# Patient Record
Sex: MALE | Race: WHITE | Employment: FULL TIME | URBAN - METROPOLITAN AREA
[De-identification: names, ages, dates, MRNs, and addresses within clinical notes are randomized per-mention and may not be internally consistent; named-entity substitution may affect disease eponyms.]

---

## 2020-08-21 ENCOUNTER — HOSPITAL ENCOUNTER (EMERGENCY)
Age: 60
Discharge: HOME | End: 2020-08-21
Attending: EMERGENCY MEDICINE
Payer: COMMERCIAL

## 2020-08-21 ENCOUNTER — APPOINTMENT (OUTPATIENT)
Dept: GENERAL RADIOLOGY | Age: 60
End: 2020-08-21
Payer: COMMERCIAL

## 2020-08-21 VITALS
HEART RATE: 54 BPM | RESPIRATION RATE: 16 BRPM | TEMPERATURE: 98.1 F | HEIGHT: 72 IN | SYSTOLIC BLOOD PRESSURE: 121 MMHG | WEIGHT: 165 LBS | OXYGEN SATURATION: 96 % | DIASTOLIC BLOOD PRESSURE: 56 MMHG | BODY MASS INDEX: 22.35 KG/M2

## 2020-08-21 PROCEDURE — 73552 X-RAY EXAM OF FEMUR 2/>: CPT

## 2020-08-21 PROCEDURE — 96375 TX/PRO/DX INJ NEW DRUG ADDON: CPT

## 2020-08-21 PROCEDURE — 6360000002 HC RX W HCPCS

## 2020-08-21 PROCEDURE — 99283 EMERGENCY DEPT VISIT LOW MDM: CPT

## 2020-08-21 PROCEDURE — 96374 THER/PROPH/DIAG INJ IV PUSH: CPT

## 2020-08-21 PROCEDURE — 90471 IMMUNIZATION ADMIN: CPT

## 2020-08-21 PROCEDURE — 90715 TDAP VACCINE 7 YRS/> IM: CPT

## 2020-08-21 RX ORDER — ONDANSETRON 2 MG/ML
INJECTION INTRAMUSCULAR; INTRAVENOUS
Status: COMPLETED
Start: 2020-08-21 | End: 2020-08-21

## 2020-08-21 RX ORDER — HYDROCODONE BITARTRATE AND ACETAMINOPHEN 5; 325 MG/1; MG/1
1 TABLET ORAL EVERY 6 HOURS PRN
Qty: 10 TABLET | Refills: 0 | Status: SHIPPED | OUTPATIENT
Start: 2020-08-21 | End: 2020-08-24

## 2020-08-21 RX ORDER — MORPHINE SULFATE 4 MG/ML
INJECTION, SOLUTION INTRAMUSCULAR; INTRAVENOUS
Status: COMPLETED
Start: 2020-08-21 | End: 2020-08-21

## 2020-08-21 RX ORDER — MORPHINE SULFATE 4 MG/ML
4 INJECTION, SOLUTION INTRAMUSCULAR; INTRAVENOUS ONCE
Status: COMPLETED | OUTPATIENT
Start: 2020-08-21 | End: 2020-08-21

## 2020-08-21 RX ORDER — ONDANSETRON 2 MG/ML
4 INJECTION INTRAMUSCULAR; INTRAVENOUS ONCE
Status: COMPLETED | OUTPATIENT
Start: 2020-08-21 | End: 2020-08-21

## 2020-08-21 RX ORDER — ONDANSETRON 4 MG/1
4 TABLET, ORALLY DISINTEGRATING ORAL ONCE
Status: DISCONTINUED | OUTPATIENT
Start: 2020-08-21 | End: 2020-08-21

## 2020-08-21 RX ORDER — PRAVASTATIN SODIUM 20 MG
20 TABLET ORAL DAILY
COMMUNITY
End: 2020-08-25

## 2020-08-21 RX ADMIN — MORPHINE SULFATE 4 MG: 4 INJECTION, SOLUTION INTRAMUSCULAR; INTRAVENOUS at 20:41

## 2020-08-21 RX ADMIN — Medication 4 MG: at 20:41

## 2020-08-21 RX ADMIN — ONDANSETRON 4 MG: 2 INJECTION INTRAMUSCULAR; INTRAVENOUS at 20:47

## 2020-08-21 RX ADMIN — TETANUS TOXOID, REDUCED DIPHTHERIA TOXOID AND ACELLULAR PERTUSSIS VACCINE, ADSORBED 0.5 ML: 5; 2.5; 8; 8; 2.5 SUSPENSION INTRAMUSCULAR at 20:50

## 2020-08-21 ASSESSMENT — ENCOUNTER SYMPTOMS: COLOR CHANGE: 1

## 2020-08-21 ASSESSMENT — PAIN DESCRIPTION - DESCRIPTORS
DESCRIPTORS: BURNING
DESCRIPTORS: ACHING

## 2020-08-21 ASSESSMENT — PAIN SCALES - GENERAL
PAINLEVEL_OUTOF10: 6
PAINLEVEL_OUTOF10: 7

## 2020-08-21 ASSESSMENT — PAIN DESCRIPTION - ORIENTATION
ORIENTATION: RIGHT;UPPER
ORIENTATION: RIGHT;UPPER

## 2020-08-21 ASSESSMENT — PAIN DESCRIPTION - LOCATION
LOCATION: LEG
LOCATION: LEG

## 2020-08-21 ASSESSMENT — PAIN DESCRIPTION - PAIN TYPE: TYPE: ACUTE PAIN

## 2020-08-21 NOTE — ED PROVIDER NOTES
41-year-old male presenting with bruising to the right leg and a little bit of discomfort. He has a history of trauma to the right leg and surgical repair a number of years ago. He was working on a house and clearing things out. Within the floor there was a cut out where people used to work on tractors. The board covering it slipped and he fell down into it. There was a tight fall for him and he has significant bruising on the right femur laterally. No current bleeding, no injury to his abdomen or chest or head or neck. No focal neurologic deficits. Distal pulses are all intact, no distress just little bit of discomfort for him. He kept working that day clearing at the house and even went to his regular job the next day as well. Review of Systems   Skin: Positive for color change and wound. All other systems reviewed and are negative. Physical Exam  Constitutional:       General: He is not in acute distress. Appearance: He is well-developed. HENT:      Head: Normocephalic and atraumatic. Eyes:      Pupils: Pupils are equal, round, and reactive to light. Neck:      Musculoskeletal: Normal range of motion and neck supple. Thyroid: No thyromegaly. Cardiovascular:      Rate and Rhythm: Normal rate and regular rhythm. Pulmonary:      Effort: Pulmonary effort is normal. No respiratory distress. Breath sounds: Normal breath sounds. No wheezing. Abdominal:      General: There is no distension. Palpations: Abdomen is soft. There is no mass. Tenderness: There is no abdominal tenderness. There is no guarding or rebound. Musculoskeletal: Normal range of motion. General: Swelling present. No tenderness. Legs:    Skin:     General: Skin is warm and dry. Findings: No erythema.              Comments: Small quarter size abrasion on the right lateral leg about mid thigh    Compartments are soft, distal pulses intact no concern for compartment syndrome or ischemic injury from the swelling   Neurological:      Mental Status: He is alert and oriented to person, place, and time. Cranial Nerves: No cranial nerve deficit. Procedures    [facility]             --------------------------------------------- PAST HISTORY ---------------------------------------------  Past Medical History:  has a past medical history of Hyperlipidemia and Thyroid disease. Past Surgical History:  has a past surgical history that includes Femur Surgery and Toe amputation. Social History:  reports that he has never smoked. He has never used smokeless tobacco. He reports that he does not drink alcohol or use drugs. Family History: family history is not on file. The patients home medications have been reviewed. Allergies: Patient has no known allergies. -------------------------------------------------- RESULTS -------------------------------------------------  Labs:  No results found for this visit on 08/21/20. Radiology:  XR FEMUR RIGHT (MIN 2 VIEWS)   Final Result      Intramedullary nagi is fractured at the level of the lesser trochanter   and distal femoral diaphysis. Expansile bone lesion of the proximal femoral diaphysis is fairly   well-corticated. ------------------------- NURSING NOTES AND VITALS REVIEWED ---------------------------  Date / Time Roomed:  8/21/2020  6:33 PM  ED Bed Assignment:  07/07    The nursing notes within the ED encounter and vital signs as below have been reviewed.    BP (!) 121/56   Pulse 54   Temp 98.1 °F (36.7 °C) (Infrared)   Resp 16   Ht 6' (1.829 m)   Wt 165 lb (74.8 kg)   SpO2 96%   BMI 22.38 kg/m²   Oxygen Saturation Interpretation: Normal      ------------------------------------------ PROGRESS NOTES ------------------------------------------  I have spoken with the patient and discussed todays results, in addition to providing specific details for the plan of care and counseling regarding the diagnosis and prognosis. Their questions are answered at this time and they are agreeable with the plan. I discussed at length with them reasons for immediate return here for re evaluation. They will followup with primary care by calling their office tomorrow. Spoke with Dr. Dima Quesada, orthopedic surgery, he reports that the patient most likely has had a break in his femoral nagi for longer than the last day. Considering that he is able to walk on his leg and is only limping a little bit supports this. He has distal pulses intact, he does have the bruise but no compartment syndrome, no concerns for ischemia on his lower leg. He will follow-up with Dr. Dima Quesada early next week.    --------------------------------- ADDITIONAL PROVIDER NOTES ---------------------------------  At this time the patient is without objective evidence of an acute process requiring hospitalization or inpatient management. They have remained hemodynamically stable throughout their entire ED visit and are stable for discharge with outpatient follow-up. The plan has been discussed in detail and they are aware of the specific conditions for emergent return, as well as the importance of follow-up. Discharge Medication List as of 8/21/2020  8:54 PM      START taking these medications    Details   HYDROcodone-acetaminophen (NORCO) 5-325 MG per tablet Take 1 tablet by mouth every 6 hours as needed for Pain for up to 3 days. , Disp-10 tablet,R-0Print             Diagnosis:  1. Injury of right lower extremity, initial encounter    2. Complication internal fixation device such as nail, plate, or nagi, initial encounter ([facility]        Disposition:  Patient's disposition: Discharge to home  Patient's condition is stable.                   Ciera Frost DO  08/22/20 1046

## 2020-08-22 NOTE — ED NOTES
Purple bruising right proximal femur ice galilea to site, abrasion lateral thigh, scabbed, cleansed with wound cleanser and bacitracin ointment bandaid applied.        Felicia Campos RN  08/21/20 2100

## 2020-08-25 ENCOUNTER — OFFICE VISIT (OUTPATIENT)
Dept: ORTHOPEDIC SURGERY | Age: 60
End: 2020-08-25
Payer: COMMERCIAL

## 2020-08-25 VITALS — TEMPERATURE: 97.1 F

## 2020-08-25 PROCEDURE — 99203 OFFICE O/P NEW LOW 30 MIN: CPT | Performed by: ORTHOPAEDIC SURGERY

## 2020-08-25 RX ORDER — PRAVASTATIN SODIUM 40 MG
TABLET ORAL
COMMUNITY
Start: 2020-08-01 | End: 2022-07-21 | Stop reason: SDUPTHER

## 2020-08-25 RX ORDER — LEVOTHYROXINE SODIUM 0.2 MG/1
TABLET ORAL
COMMUNITY
Start: 2020-08-01 | End: 2022-07-21 | Stop reason: SDUPTHER

## 2020-08-25 RX ORDER — MELOXICAM 15 MG/1
TABLET ORAL
COMMUNITY
Start: 2020-07-21 | End: 2022-07-21 | Stop reason: SDUPTHER

## 2020-08-25 RX ORDER — DIAZEPAM 5 MG/1
TABLET ORAL
COMMUNITY
Start: 2020-05-18 | End: 2022-07-21 | Stop reason: ALTCHOICE

## 2020-08-25 NOTE — PROGRESS NOTES
New Hip Patient     Referring Provider:   No referring provider defined for this encounter. CHIEF COMPLAINT:   Chief Complaint   Patient presents with   [facility] ED Follow-up     8/21 ( R ) leg femoral fx     Fracture     States that he fell into a 4x3 service pit, straight down direct impact.  Leg Pain     ( R ) leg, sensitivity to the leg. Did a 3.5 mile race on 8/22 despite the fall. Pt states its tight and brusied         HPI:    Radha Plummer is a 56y.o. year old male who is seen today  for evaluation of right hip pain. He reports the pain has been ongoing for the past 4 days. He does recall a specific injury which started the pain. Patient reports 4 days ago he was cleaning out the garage and fell into the tractor pet about 3 feet. Reports that he smacked the lateral side of his thigh on the edge when he fell. Seen and treated in the emergency department. He reports symptoms are improving. The patient does not have mechanical symptoms. He denies a feeling of instability. PAST MEDICAL HISTORY  Past Medical History:   Diagnosis Date    Hyperlipidemia     Thyroid disease        PAST SURGICAL HISTORY  Past Surgical History:   Procedure Laterality Date    FEMUR SURGERY      TOE AMPUTATION         FAMILY HISTORY   No family history on file. SOCIAL HISTORY  Social History     Occupational History    Not on file   Tobacco Use    Smoking status: Never Smoker    Smokeless tobacco: Never Used   Substance and Sexual Activity    Alcohol use: No    Drug use: No    Sexual activity: Not on file       CURRENT MEDICATIONS     Current Outpatient Medications:     diazePAM (VALIUM) 5 MG tablet, , Disp: , Rfl:     meloxicam (MOBIC) 15 MG tablet, , Disp: , Rfl:     pravastatin (PRAVACHOL) 40 MG tablet, , Disp: , Rfl:     levothyroxine (SYNTHROID) 200 MCG tablet, , Disp: , Rfl:     levothyroxine (SYNTHROID) 175 MCG tablet, Take 175 mcg by mouth daily.   , Disp: , Rfl:     ALLERGIES  No Known He will call the office if symptoms worsen. He will follow-up as needed. Chris Avila, [address] [facility]  Orthopaedic Surgery   8/25/20  9:01 AM      Staff Addendum    I have seen and evaluated the patient and agree with the assessment and plan as documented by Chris Avila CNP. I have performed the key components of the history and physical examination and concur with the findings and plan, and have made changes where appropriate/necessary. Agree with above. He has femur fracture fixed many years ago, the nail is cracked in 2 places which I suspect is chronic. He does not require any further management currently. He will continue to progress with normal activities. We will see him back as needed.       Jay White MD  [address]

## 2020-09-21 ENCOUNTER — OFFICE VISIT (OUTPATIENT)
Dept: ORTHOPEDIC SURGERY | Age: 60
End: 2020-09-21
Payer: COMMERCIAL

## 2020-09-21 VITALS — HEIGHT: 72 IN | WEIGHT: 165 LBS | BODY MASS INDEX: 22.35 KG/M2 | TEMPERATURE: 97.1 F

## 2020-09-21 PROCEDURE — 99213 OFFICE O/P EST LOW 20 MIN: CPT | Performed by: ORTHOPAEDIC SURGERY

## 2020-09-21 NOTE — PROGRESS NOTES
Follow Up Visit     Lynn Easley returns today for follow up visit regarding his right hip pain from a fall that happened on 8/21/20. Treatment thus far has included non-op management. He noticed a swelling of the right hip that does not cause him discomfort but it is bruised. He reports symptoms are improved. Physical Exam:     Height: 6' (1.829 m), Weight: 165 lb (74.8 kg)    General: Alert and oriented x3, no acute distress  Cardiovascular/pulmonary: No labored breathing, peripheral perfusion intact  Musculoskeletal:    Right hip soft tissue swelling present fluctuant no redness or tenderness present on exam.  Area of swelling is directly over the greater trochanter her bursa. No pain present on exam no signs or symptoms of infection present. Controlled Substances Monitoring:      Imaging:  Being obtained today. Assessment: Right hip swelling      Plan: Today we discussed his right hip. Patient had an injury about 5 weeks ago which he states has improved. He however states he noticed increased swelling around his right hip that has remained but causes no pain. Patient followed up today for reassurance that this would improve on its own. Patient likely has residual hematoma or swelling from trochanteric bursitis. Patient was advised to take over-the-counter anti-inflammatories, ice the affected area. He verbalized understanding. We discussed signs and symptoms of infection and follow-up appointment if pain to the area develops. Patient verbalizes understanding. He understands also that this could take some time to resolve. Fadi Fernandez [facility]  Orthopedic Surgery   09/21/20  4:55 PM      Staff Addendum    I have seen and evaluated the patient and agree with the assessment and plan as documented by Fadi Fernandez CNP. I have performed the key components of the history and physical examination and concur with the findings and plan, and have made changes where appropriate/necessary. Jay White MD  [address] [place] [address]

## 2020-11-21 LAB — PROSTATE SPECIFIC ANTIGEN: NORMAL

## 2020-12-13 ENCOUNTER — HOSPITAL ENCOUNTER (OUTPATIENT)
Age: 60
Discharge: HOME | End: 2020-12-15
Payer: COMMERCIAL

## 2020-12-13 ENCOUNTER — HOSPITAL ENCOUNTER (OUTPATIENT)
Dept: GENERAL RADIOLOGY | Age: 60
Discharge: HOME | End: 2020-12-15
Payer: COMMERCIAL

## 2020-12-13 PROCEDURE — 72040 X-RAY EXAM NECK SPINE 2-3 VW: CPT

## 2021-06-02 LAB
ALBUMIN SERPL-MCNC: NORMAL G/DL
ALP BLD-CCNC: NORMAL U/L
ALT SERPL-CCNC: NORMAL U/L
ANION GAP SERPL CALCULATED.3IONS-SCNC: NORMAL MMOL/L
AST SERPL-CCNC: NORMAL U/L
AVERAGE GLUCOSE: NORMAL
BASOPHILS ABSOLUTE: NORMAL
BASOPHILS RELATIVE PERCENT: NORMAL
BILIRUB SERPL-MCNC: NORMAL MG/DL
BUN BLDV-MCNC: NORMAL MG/DL
CALCIUM SERPL-MCNC: NORMAL MG/DL
CHLORIDE BLD-SCNC: NORMAL MMOL/L
CHOLESTEROL, TOTAL: NORMAL
CHOLESTEROL/HDL RATIO: NORMAL
CO2: NORMAL
CREAT SERPL-MCNC: NORMAL MG/DL
EOSINOPHILS ABSOLUTE: NORMAL
EOSINOPHILS RELATIVE PERCENT: NORMAL
GFR CALCULATED: NORMAL
GLUCOSE BLD-MCNC: NORMAL MG/DL
HBA1C MFR BLD: 5 %
HCT VFR BLD CALC: NORMAL %
HDLC SERPL-MCNC: NORMAL MG/DL
HEMOGLOBIN: NORMAL
LDL CHOLESTEROL CALCULATED: NORMAL
LYMPHOCYTES ABSOLUTE: NORMAL
LYMPHOCYTES RELATIVE PERCENT: NORMAL
MCH RBC QN AUTO: NORMAL PG
MCHC RBC AUTO-ENTMCNC: NORMAL G/DL
MCV RBC AUTO: NORMAL FL
MONOCYTES ABSOLUTE: NORMAL
MONOCYTES RELATIVE PERCENT: NORMAL
NEUTROPHILS ABSOLUTE: NORMAL
NEUTROPHILS RELATIVE PERCENT: NORMAL
NONHDLC SERPL-MCNC: NORMAL MG/DL
PLATELET # BLD: NORMAL 10*3/UL
PMV BLD AUTO: NORMAL FL
POTASSIUM SERPL-SCNC: NORMAL MMOL/L
RBC # BLD: NORMAL 10*6/UL
SODIUM BLD-SCNC: NORMAL MMOL/L
TOTAL PROTEIN: NORMAL
TRIGL SERPL-MCNC: NORMAL MG/DL
VLDLC SERPL CALC-MCNC: NORMAL MG/DL
WBC # BLD: NORMAL 10*3/UL

## 2021-09-09 VITALS
BODY MASS INDEX: 22.62 KG/M2 | HEART RATE: 66 BPM | WEIGHT: 167 LBS | DIASTOLIC BLOOD PRESSURE: 54 MMHG | RESPIRATION RATE: 16 BRPM | HEIGHT: 72 IN | OXYGEN SATURATION: 98 % | SYSTOLIC BLOOD PRESSURE: 98 MMHG

## 2022-07-21 ENCOUNTER — OFFICE VISIT (OUTPATIENT)
Dept: PRIMARY CARE CLINIC | Age: 62
End: 2022-07-21
Payer: COMMERCIAL

## 2022-07-21 VITALS
BODY MASS INDEX: 22.62 KG/M2 | HEIGHT: 72 IN | DIASTOLIC BLOOD PRESSURE: 86 MMHG | TEMPERATURE: 98 F | WEIGHT: 167 LBS | RESPIRATION RATE: 18 BRPM | SYSTOLIC BLOOD PRESSURE: 128 MMHG | OXYGEN SATURATION: 97 % | HEART RATE: 53 BPM

## 2022-07-21 DIAGNOSIS — E55.9 VITAMIN D DEFICIENCY: ICD-10-CM

## 2022-07-21 DIAGNOSIS — E78.2 MIXED HYPERLIPIDEMIA: ICD-10-CM

## 2022-07-21 DIAGNOSIS — E06.3 HYPOTHYROIDISM DUE TO HASHIMOTO'S THYROIDITIS: ICD-10-CM

## 2022-07-21 DIAGNOSIS — Z12.11 COLON CANCER SCREENING: ICD-10-CM

## 2022-07-21 DIAGNOSIS — E03.8 HYPOTHYROIDISM DUE TO HASHIMOTO'S THYROIDITIS: ICD-10-CM

## 2022-07-21 DIAGNOSIS — Z00.00 WELL ADULT EXAM: Primary | ICD-10-CM

## 2022-07-21 DIAGNOSIS — Z12.5 PROSTATE CANCER SCREENING: ICD-10-CM

## 2022-07-21 DIAGNOSIS — Z76.89 ESTABLISHING CARE WITH NEW DOCTOR, ENCOUNTER FOR: ICD-10-CM

## 2022-07-21 PROCEDURE — 99396 PREV VISIT EST AGE 40-64: CPT | Performed by: FAMILY MEDICINE

## 2022-07-21 RX ORDER — LEVOTHYROXINE SODIUM 0.2 MG/1
200 TABLET ORAL DAILY
Qty: 30 TABLET | Refills: 2 | Status: SHIPPED
Start: 2022-07-21 | End: 2022-09-21 | Stop reason: SDUPTHER

## 2022-07-21 RX ORDER — PRAVASTATIN SODIUM 40 MG
40 TABLET ORAL DAILY
Qty: 90 TABLET | Refills: 2 | Status: SHIPPED | OUTPATIENT
Start: 2022-07-21

## 2022-07-21 RX ORDER — MELOXICAM 15 MG/1
15 TABLET ORAL DAILY
Qty: 30 TABLET | Refills: 0 | Status: SHIPPED
Start: 2022-07-21 | End: 2022-09-21 | Stop reason: SDUPTHER

## 2022-07-21 SDOH — ECONOMIC STABILITY: FOOD INSECURITY: WITHIN THE PAST 12 MONTHS, THE FOOD YOU BOUGHT JUST DIDN'T LAST AND YOU DIDN'T HAVE MONEY TO GET MORE.: NEVER TRUE

## 2022-07-21 SDOH — ECONOMIC STABILITY: FOOD INSECURITY: WITHIN THE PAST 12 MONTHS, YOU WORRIED THAT YOUR FOOD WOULD RUN OUT BEFORE YOU GOT MONEY TO BUY MORE.: NEVER TRUE

## 2022-07-21 ASSESSMENT — ENCOUNTER SYMPTOMS
CONSTIPATION: 0
SORE THROAT: 0
ABDOMINAL PAIN: 0
SHORTNESS OF BREATH: 0
VOMITING: 0
WHEEZING: 0
PHOTOPHOBIA: 0
COUGH: 0
BLOOD IN STOOL: 0
DIARRHEA: 0
RHINORRHEA: 0
EYE REDNESS: 0
NAUSEA: 0

## 2022-07-21 ASSESSMENT — PATIENT HEALTH QUESTIONNAIRE - PHQ9
SUM OF ALL RESPONSES TO PHQ9 QUESTIONS 1 & 2: 0
SUM OF ALL RESPONSES TO PHQ QUESTIONS 1-9: 0
SUM OF ALL RESPONSES TO PHQ QUESTIONS 1-9: 0
2. FEELING DOWN, DEPRESSED OR HOPELESS: 0
1. LITTLE INTEREST OR PLEASURE IN DOING THINGS: 0
SUM OF ALL RESPONSES TO PHQ QUESTIONS 1-9: 0
SUM OF ALL RESPONSES TO PHQ QUESTIONS 1-9: 0

## 2022-07-21 ASSESSMENT — SOCIAL DETERMINANTS OF HEALTH (SDOH): HOW HARD IS IT FOR YOU TO PAY FOR THE VERY BASICS LIKE FOOD, HOUSING, MEDICAL CARE, AND HEATING?: NOT HARD AT ALL

## 2022-07-21 NOTE — PROGRESS NOTES
2022    Chief Complaint   Patient presents with    Established New Doctor     JOHN  Nain Tom (:  1960) is a 57 y.o. male, here for evaluation of the following medical concerns:    Patient is a 68-year-old male with a past medical history of hypothyroidism, arthritis, hyperlipidemia who presents today to establish care with myself. Hypothyroidism: Patient reports he has not been regularly taking his medications as previous provider recently retired. Patient on Synthroid 200 mcg daily. Hyperlipidemia: Patient is on pravastatin 40 mg daily. Needs updated lipid panel. Tolerating this medication well without any issues. Patient does regularly exercise and run. BMI 22. History of mild panic attacks at times. Previously used Valium. No recent issues. Chronic low back pain/arthritis: Patient does use NSAIDs, topical pain/ointments, and Mobic at times. HM: We will update. Also due for shingles vaccine. Review of Systems   Constitutional:  Negative for chills, fatigue and fever. HENT:  Negative for congestion, hearing loss, postnasal drip, rhinorrhea, sneezing, sore throat and tinnitus. Eyes:  Negative for photophobia and redness. Respiratory:  Negative for cough, shortness of breath and wheezing. Cardiovascular:  Negative for chest pain, palpitations and leg swelling. Gastrointestinal:  Negative for abdominal pain, blood in stool, constipation, diarrhea, nausea and vomiting. Endocrine: Negative for polydipsia and polyuria. Genitourinary:  Negative for difficulty urinating, dysuria, frequency and hematuria. Musculoskeletal:  Negative for arthralgias and myalgias. Skin:  Negative for rash. Neurological:  Negative for dizziness, syncope, weakness, light-headedness, numbness and headaches. Psychiatric/Behavioral:  The patient is not nervous/anxious. Prior to Visit Medications    Medication Sig Taking?  Authorizing Provider   pravastatin (PRAVACHOL) 40 MG tablet Take 1 tablet by mouth in the morning. Yes Rocio Fried MD   levothyroxine (SYNTHROID) 200 MCG tablet Take 1 tablet by mouth in the morning. Yes Rocio Fried MD   meloxicam (MOBIC) 15 MG tablet Take 1 tablet by mouth in the morning. Yes Rocio Fried MD        No Known Allergies    Past Medical History:   Diagnosis Date    Hyperlipidemia     Thyroid disease         Past Surgical History:   Procedure Laterality Date    FEMUR SURGERY      Julio C in place, pins removed    TOE AMPUTATION      work accident       History reviewed. No pertinent family history.     Immunization History   Administered Date(s) Administered    COVID-19, MODERNA BLUE border, Primary or Immunocompromised, (age 12y+), IM, 100 mcg/0.5mL 02/18/2021, 03/26/2021, 12/14/2021    Influenza, MDCK Quadv, IM, PF (Flucelvax 2 yrs and older) 10/25/2018    Tdap (Boostrix, Adacel) 08/21/2020       Health Maintenance   Topic Date Due    Colorectal Cancer Screen  Never done    Shingles vaccine (1 of 2) Never done    Prostate Specific Antigen (PSA) Screening or Monitoring  11/20/2021    COVID-19 Vaccine (4 - Booster for Moderna series) 04/14/2022    Lipids  06/01/2022    Flu vaccine (1) 09/01/2022    Depression Screen  07/21/2023    DTaP/Tdap/Td vaccine (2 - Td or Tdap) 08/21/2030    Hepatitis A vaccine  Aged Out    Hepatitis B vaccine  Aged Out    Hib vaccine  Aged Out    Meningococcal (ACWY) vaccine  Aged Out    Pneumococcal 0-64 years Vaccine  Aged Out    Hepatitis C screen  Discontinued    HIV screen  Discontinued       Social History     Socioeconomic History    Marital status: Single     Spouse name: Not on file    Number of children: Not on file    Years of education: Not on file    Highest education level: Not on file   Occupational History    Not on file   Tobacco Use    Smoking status: Never    Smokeless tobacco: Never   Substance and Sexual Activity    Alcohol use: No    Drug use: No    Sexual activity: Not on file   Other Topics Concern    Not on file   Social History Narrative    Not on file     Social Determinants of Health     Financial Resource Strain: Low Risk     Difficulty of Paying Living Expenses: Not hard at all   Food Insecurity: No Food Insecurity    Worried About Running Out of Food in the Last Year: Never true    Ran Out of Food in the Last Year: Never true   Transportation Needs: Not on file   Physical Activity: Not on file   Stress: Not on file   Social Connections: Not on file   Intimate Partner Violence: Not on file   Housing Stability: Not on file         Vitals:    07/21/22 1358   BP: 128/86   Pulse: 53   Resp: 18   Temp: 98 °F (36.7 °C)   TempSrc: Temporal   SpO2: 97%   Weight: 167 lb (75.8 kg)   Height: 6' (1.829 m)       Estimated body mass index is 22.65 kg/m² as calculated from the following:    Height as of this encounter: 6' (1.829 m). Weight as of this encounter: 167 lb (75.8 kg). Physical Exam  Vitals and nursing note reviewed. Constitutional:       Appearance: He is well-developed. HENT:      Head: Normocephalic. Right Ear: Tympanic membrane and external ear normal.      Left Ear: Tympanic membrane and external ear normal.   Eyes:      Extraocular Movements: Extraocular movements intact. Conjunctiva/sclera: Conjunctivae normal.      Pupils: Pupils are equal, round, and reactive to light. Neck:      Trachea: Trachea normal.   Cardiovascular:      Rate and Rhythm: Normal rate and regular rhythm. Pulses:           Radial pulses are 2+ on the right side and 2+ on the left side. Posterior tibial pulses are 2+ on the right side and 2+ on the left side. Heart sounds: Normal heart sounds. No murmur heard. Pulmonary:      Effort: Pulmonary effort is normal. No respiratory distress. Breath sounds: Normal breath sounds. No decreased breath sounds, wheezing or rales. Abdominal:      General: Bowel sounds are normal. There is no distension.       Palpations: Abdomen is

## 2022-08-04 LAB — NONINV COLON CA DNA+OCC BLD SCRN STL QL: NEGATIVE

## 2022-09-16 DIAGNOSIS — Z12.5 PROSTATE CANCER SCREENING: ICD-10-CM

## 2022-09-16 DIAGNOSIS — E78.2 MIXED HYPERLIPIDEMIA: ICD-10-CM

## 2022-09-16 DIAGNOSIS — E55.9 VITAMIN D DEFICIENCY: ICD-10-CM

## 2022-09-16 DIAGNOSIS — E03.8 HYPOTHYROIDISM DUE TO HASHIMOTO'S THYROIDITIS: ICD-10-CM

## 2022-09-16 DIAGNOSIS — E06.3 HYPOTHYROIDISM DUE TO HASHIMOTO'S THYROIDITIS: ICD-10-CM

## 2022-09-16 LAB
ALBUMIN SERPL-MCNC: 4.4 G/DL (ref 3.5–5.2)
ALP BLD-CCNC: 74 U/L (ref 40–129)
ALT SERPL-CCNC: 35 U/L (ref 0–40)
ANION GAP SERPL CALCULATED.3IONS-SCNC: 13 MMOL/L (ref 7–16)
AST SERPL-CCNC: 34 U/L (ref 0–39)
BASOPHILS ABSOLUTE: 0.02 E9/L (ref 0–0.2)
BASOPHILS RELATIVE PERCENT: 0.5 % (ref 0–2)
BILIRUB SERPL-MCNC: 0.6 MG/DL (ref 0–1.2)
BUN BLDV-MCNC: 19 MG/DL (ref 6–23)
CALCIUM SERPL-MCNC: 9.5 MG/DL (ref 8.6–10.2)
CHLORIDE BLD-SCNC: 104 MMOL/L (ref 98–107)
CHOLESTEROL, TOTAL: 202 MG/DL (ref 0–199)
CO2: 24 MMOL/L (ref 22–29)
CREAT SERPL-MCNC: 0.9 MG/DL (ref 0.7–1.2)
EOSINOPHILS ABSOLUTE: 0.15 E9/L (ref 0.05–0.5)
EOSINOPHILS RELATIVE PERCENT: 3.4 % (ref 0–6)
GFR AFRICAN AMERICAN: >60
GFR NON-AFRICAN AMERICAN: >60 ML/MIN/1.73
GLUCOSE BLD-MCNC: 110 MG/DL (ref 74–99)
HCT VFR BLD CALC: 45 % (ref 37–54)
HDLC SERPL-MCNC: 71 MG/DL
HEMOGLOBIN: 15.4 G/DL (ref 12.5–16.5)
IMMATURE GRANULOCYTES #: 0.01 E9/L
IMMATURE GRANULOCYTES %: 0.2 % (ref 0–5)
LDL CHOLESTEROL CALCULATED: 121 MG/DL (ref 0–99)
LYMPHOCYTES ABSOLUTE: 1.51 E9/L (ref 1.5–4)
LYMPHOCYTES RELATIVE PERCENT: 34.1 % (ref 20–42)
MCH RBC QN AUTO: 31.8 PG (ref 26–35)
MCHC RBC AUTO-ENTMCNC: 34.2 % (ref 32–34.5)
MCV RBC AUTO: 93 FL (ref 80–99.9)
MONOCYTES ABSOLUTE: 0.49 E9/L (ref 0.1–0.95)
MONOCYTES RELATIVE PERCENT: 11.1 % (ref 2–12)
NEUTROPHILS ABSOLUTE: 2.25 E9/L (ref 1.8–7.3)
NEUTROPHILS RELATIVE PERCENT: 50.7 % (ref 43–80)
PDW BLD-RTO: 13.1 FL (ref 11.5–15)
PLATELET # BLD: 176 E9/L (ref 130–450)
PMV BLD AUTO: 11.1 FL (ref 7–12)
POTASSIUM SERPL-SCNC: 4.3 MMOL/L (ref 3.5–5)
PROSTATE SPECIFIC ANTIGEN: 0.57 NG/ML (ref 0–4)
RBC # BLD: 4.84 E12/L (ref 3.8–5.8)
SODIUM BLD-SCNC: 141 MMOL/L (ref 132–146)
T4 FREE: 1.61 NG/DL (ref 0.93–1.7)
TOTAL PROTEIN: 6.8 G/DL (ref 6.4–8.3)
TRIGL SERPL-MCNC: 52 MG/DL (ref 0–149)
TSH SERPL DL<=0.05 MIU/L-ACNC: 0.67 UIU/ML (ref 0.27–4.2)
VITAMIN D 25-HYDROXY: 97 NG/ML (ref 30–100)
VLDLC SERPL CALC-MCNC: 10 MG/DL
WBC # BLD: 4.4 E9/L (ref 4.5–11.5)

## 2022-09-21 ENCOUNTER — OFFICE VISIT (OUTPATIENT)
Dept: PRIMARY CARE CLINIC | Age: 62
End: 2022-09-21
Payer: COMMERCIAL

## 2022-09-21 VITALS
SYSTOLIC BLOOD PRESSURE: 132 MMHG | RESPIRATION RATE: 18 BRPM | DIASTOLIC BLOOD PRESSURE: 84 MMHG | BODY MASS INDEX: 22.62 KG/M2 | HEART RATE: 50 BPM | WEIGHT: 167 LBS | HEIGHT: 72 IN | TEMPERATURE: 97.9 F | OXYGEN SATURATION: 97 %

## 2022-09-21 DIAGNOSIS — B86 SCABIES: ICD-10-CM

## 2022-09-21 DIAGNOSIS — E55.9 VITAMIN D DEFICIENCY: ICD-10-CM

## 2022-09-21 DIAGNOSIS — E06.3 HYPOTHYROIDISM DUE TO HASHIMOTO'S THYROIDITIS: ICD-10-CM

## 2022-09-21 DIAGNOSIS — E03.8 HYPOTHYROIDISM DUE TO HASHIMOTO'S THYROIDITIS: ICD-10-CM

## 2022-09-21 DIAGNOSIS — E78.2 MIXED HYPERLIPIDEMIA: ICD-10-CM

## 2022-09-21 DIAGNOSIS — M54.50 CHRONIC BILATERAL LOW BACK PAIN WITHOUT SCIATICA: Primary | ICD-10-CM

## 2022-09-21 DIAGNOSIS — R73.01 IMPAIRED FASTING BLOOD SUGAR: ICD-10-CM

## 2022-09-21 DIAGNOSIS — G89.29 CHRONIC BILATERAL LOW BACK PAIN WITHOUT SCIATICA: Primary | ICD-10-CM

## 2022-09-21 PROCEDURE — 99214 OFFICE O/P EST MOD 30 MIN: CPT | Performed by: FAMILY MEDICINE

## 2022-09-21 RX ORDER — LEVOTHYROXINE SODIUM 0.2 MG/1
200 TABLET ORAL DAILY
Qty: 90 TABLET | Refills: 1 | Status: SHIPPED | OUTPATIENT
Start: 2022-09-21

## 2022-09-21 RX ORDER — PERMETHRIN 50 MG/G
CREAM TOPICAL ONCE
Qty: 60 G | Refills: 0 | Status: SHIPPED
Start: 2022-09-21 | End: 2022-09-26 | Stop reason: SDUPTHER

## 2022-09-21 RX ORDER — MELOXICAM 15 MG/1
15 TABLET ORAL DAILY
Qty: 30 TABLET | Refills: 0 | Status: SHIPPED | OUTPATIENT
Start: 2022-09-21

## 2022-09-21 ASSESSMENT — ENCOUNTER SYMPTOMS
ABDOMINAL PAIN: 0
VOMITING: 0
NAUSEA: 0
SHORTNESS OF BREATH: 0
RHINORRHEA: 0
SORE THROAT: 0
DIARRHEA: 0
CONSTIPATION: 0
WHEEZING: 0

## 2022-09-21 NOTE — PROGRESS NOTES
2022     Chief Complaint   Patient presents with    Results       HPI  Lizzy Wright (:  1960) is a 57 y.o. male, here for evaluation of the following medical concerns:    Patient is a 58-year-old male with a past medical history of hypothyroidism, arthritis, hyperlipidemia who presents today to f/u his establish care apt and lab. Hypothyroidism: Patient reports he has not been regularly taking his medications as previous provider recently retired. Patient on Synthroid 200 mcg daily. Hyperlipidemia: Patient is on pravastatin 40 mg daily. Tolerating this medication well without any issues. Patient does regularly exercise and run. BMI 22. History of mild panic attacks at times. Previously used Valium. No recent issues. Chronic low back pain/arthritis: Patient does use NSAIDs, topical pain/ointments, and Mobic at times. Concerns that the patient's significant other may have been exposed to scabies. HM: We will update. Also due for shingles vaccine. Labs: CBC essentially within normal limits. CMP with a mildly elevated fasting glucose of 110. Total cholesterol 202. Triglycerides 52. HDL 71. . He has had 0.6. Free T4 1.6. Vit D 97. PSA 0.5. Review of Systems   Constitutional:  Negative for chills and fever. HENT:  Negative for congestion, rhinorrhea and sore throat. Respiratory:  Negative for shortness of breath and wheezing. Cardiovascular:  Negative for chest pain and leg swelling. Gastrointestinal:  Negative for abdominal pain, constipation, diarrhea, nausea and vomiting. Skin:  Negative for rash. Neurological:  Negative for light-headedness and headaches. Past Medical History:   Diagnosis Date    Hyperlipidemia     Thyroid disease        Prior to Visit Medications    Medication Sig Taking?  Authorizing Provider   levothyroxine (SYNTHROID) 200 MCG tablet Take 1 tablet by mouth Daily Yes Danny Giles MD   meloxicaleida [facility]) 15 MG tablet Take 1 tablet by mouth daily Yes Leatha Lr MD   permethrin (ELIMITE) 5 % cream Apply topically once for 1 dose Apply topically as directed Yes Leatha Lr MD   pravastatin (PRAVACHOL) 40 MG tablet Take 1 tablet by mouth in the morning. Yes Leatha Lr MD        No Known Allergies    Social History     Tobacco Use    Smoking status: Never    Smokeless tobacco: Never   Substance Use Topics    Alcohol use: No           Vitals:    09/21/22 1640   BP: 132/84   Pulse: 50   Resp: 18   Temp: 97.9 °F (36.6 °C)   TempSrc: Temporal   SpO2: 97%   Weight: 167 lb (75.8 kg)   Height: 6' (1.829 m)     Estimated body mass index is 22.65 kg/m² as calculated from the following:    Height as of this encounter: 6' (1.829 m). Weight as of this encounter: 167 lb (75.8 kg). Physical Exam  Constitutional:       Appearance: He is well-developed. HENT:      Head: Normocephalic. Eyes:      Extraocular Movements: Extraocular movements intact. Conjunctiva/sclera: Conjunctivae normal.   Cardiovascular:      Rate and Rhythm: Normal rate and regular rhythm. Heart sounds: Normal heart sounds. No murmur heard. Pulmonary:      Effort: Pulmonary effort is normal.      Breath sounds: Normal breath sounds. No wheezing or rales. Abdominal:      General: Bowel sounds are normal.      Palpations: Abdomen is soft. Tenderness: There is no abdominal tenderness. Musculoskeletal:      Right lower leg: No edema. Left lower leg: No edema. Neurological:      General: No focal deficit present. Mental Status: He is alert. Comments: Cranial nerves grossly intact   Psychiatric:         Mood and Affect: Mood normal.         Judgment: Judgment normal.       ASSESSMENT/PLAN:    Labs: CBC essentially within normal limits. CMP with a mildly elevated fasting glucose of 110. Total cholesterol 202. Triglycerides 52. HDL 71. . He has had 0.6. Free T4 1.6. Vit D 97. PSA 0.5.     Steffany Connell was seen today for results. Diagnoses and all orders for this visit:    Chronic bilateral low back pain without sciatica  -     meloxicam (MOBIC) 15 MG tablet; Take 1 tablet by mouth daily  Pain is worse in his low back, hips, knees with running. Mobic as needed. Refill provided. Patient uses this only occasionally with significant exertion/exercise. Hypothyroidism due to Hashimoto's thyroiditis  -     levothyroxine (SYNTHROID) 200 MCG tablet; Take 1 tablet by mouth Daily  -     CBC with Auto Differential; Future  -     Comprehensive Metabolic Panel; Future  -     TSH; Future  -     T4, Free; Future  Levels appropriate. Discussed possibly slightly decreasing patient's Synthroid. For now we will give the patient on 200 mcg of Synthroid daily. Mixed hyperlipidemia  -     CBC with Auto Differential; Future  -     Comprehensive Metabolic Panel; Future  -     Lipid Panel; Future  Lipid panel slightly elevated. Lifestyle modifications reviewed and advised. Patient does in general practice healthy lifestyle habits. Patient to decrease sugar intake. Continue pravastatin 40 mg daily. Vitamin D deficiency  -     Vitamin D 25 Hydroxy; Future  Continue OTC vitamin D. Impaired fasting blood sugar  -     CBC with Auto Differential; Future  -     Comprehensive Metabolic Panel; Future  -     Hemoglobin A1C; Future    Scabies  -     permethrin (ELIMITE) 5 % cream; Apply topically once for 1 dose Apply topically as directed      Return in about 6 months (around 3/21/2023) for To Discuss Labs Results, Routine Follow-up of Chronic Conditions. An [employer]ature was used to authenticate this note.     --Ivan Morin MD on 9/21/22 at 4:43 PM EDT

## 2022-09-26 ENCOUNTER — TELEPHONE (OUTPATIENT)
Dept: PRIMARY CARE CLINIC | Age: 62
End: 2022-09-26

## 2022-09-26 DIAGNOSIS — B86 SCABIES: ICD-10-CM

## 2022-09-26 RX ORDER — PERMETHRIN 50 MG/G
CREAM TOPICAL
Qty: 360 G | Refills: 0 | Status: SHIPPED | OUTPATIENT
Start: 2022-09-26

## 2022-09-26 NOTE — TELEPHONE ENCOUNTER
Patient seen last week and was given cream for scabies permethrin (ELIMITE) 5 % cream. Girlfriend was confirmed to have Encrusted scabies. Was told that he would need to be on the cream for longer than a 1 time application. They recommended every other day for 2 weeks and the one tube will only get him 2 applications. Started to use it yesterday   [employer].    States he would need 5 refills         Please call patient back so he is aware that med was sent

## 2023-03-02 DIAGNOSIS — E06.3 HYPOTHYROIDISM DUE TO HASHIMOTO'S THYROIDITIS: ICD-10-CM

## 2023-03-02 DIAGNOSIS — E55.9 VITAMIN D DEFICIENCY: ICD-10-CM

## 2023-03-02 DIAGNOSIS — E78.2 MIXED HYPERLIPIDEMIA: ICD-10-CM

## 2023-03-02 DIAGNOSIS — R73.01 IMPAIRED FASTING BLOOD SUGAR: ICD-10-CM

## 2023-03-02 DIAGNOSIS — E03.8 HYPOTHYROIDISM DUE TO HASHIMOTO'S THYROIDITIS: ICD-10-CM

## 2023-03-02 LAB
ALBUMIN SERPL-MCNC: 4.1 G/DL (ref 3.5–5.2)
ALP BLD-CCNC: 74 U/L (ref 40–129)
ALT SERPL-CCNC: 24 U/L (ref 0–40)
ANION GAP SERPL CALCULATED.3IONS-SCNC: 13 MMOL/L (ref 7–16)
AST SERPL-CCNC: 29 U/L (ref 0–39)
BASOPHILS ABSOLUTE: 0.02 E9/L (ref 0–0.2)
BASOPHILS RELATIVE PERCENT: 0.4 % (ref 0–2)
BILIRUB SERPL-MCNC: 0.7 MG/DL (ref 0–1.2)
BUN BLDV-MCNC: 18 MG/DL (ref 6–23)
CALCIUM SERPL-MCNC: 9.4 MG/DL (ref 8.6–10.2)
CHLORIDE BLD-SCNC: 106 MMOL/L (ref 98–107)
CHOLESTEROL, TOTAL: 183 MG/DL (ref 0–199)
CO2: 24 MMOL/L (ref 22–29)
CREAT SERPL-MCNC: 0.8 MG/DL (ref 0.7–1.2)
EOSINOPHILS ABSOLUTE: 0.19 E9/L (ref 0.05–0.5)
EOSINOPHILS RELATIVE PERCENT: 3.9 % (ref 0–6)
GFR SERPL CREATININE-BSD FRML MDRD: >60 ML/MIN/1.73
GLUCOSE BLD-MCNC: 110 MG/DL (ref 74–99)
HBA1C MFR BLD: 5.3 % (ref 4–5.6)
HCT VFR BLD CALC: 48.7 % (ref 37–54)
HDLC SERPL-MCNC: 56 MG/DL
HEMOGLOBIN: 16.3 G/DL (ref 12.5–16.5)
IMMATURE GRANULOCYTES #: 0.01 E9/L
IMMATURE GRANULOCYTES %: 0.2 % (ref 0–5)
LDL CHOLESTEROL CALCULATED: 112 MG/DL (ref 0–99)
LYMPHOCYTES ABSOLUTE: 1.72 E9/L (ref 1.5–4)
LYMPHOCYTES RELATIVE PERCENT: 35.6 % (ref 20–42)
MCH RBC QN AUTO: 31.3 PG (ref 26–35)
MCHC RBC AUTO-ENTMCNC: 33.5 % (ref 32–34.5)
MCV RBC AUTO: 93.7 FL (ref 80–99.9)
MONOCYTES ABSOLUTE: 0.6 E9/L (ref 0.1–0.95)
MONOCYTES RELATIVE PERCENT: 12.4 % (ref 2–12)
NEUTROPHILS ABSOLUTE: 2.29 E9/L (ref 1.8–7.3)
NEUTROPHILS RELATIVE PERCENT: 47.5 % (ref 43–80)
PDW BLD-RTO: 13.2 FL (ref 11.5–15)
PLATELET # BLD: 184 E9/L (ref 130–450)
PMV BLD AUTO: 11 FL (ref 7–12)
POTASSIUM SERPL-SCNC: 4.7 MMOL/L (ref 3.5–5)
RBC # BLD: 5.2 E12/L (ref 3.8–5.8)
SODIUM BLD-SCNC: 143 MMOL/L (ref 132–146)
T4 FREE: 1.69 NG/DL (ref 0.93–1.7)
TOTAL PROTEIN: 6.7 G/DL (ref 6.4–8.3)
TRIGL SERPL-MCNC: 74 MG/DL (ref 0–149)
TSH SERPL DL<=0.05 MIU/L-ACNC: 0.1 UIU/ML (ref 0.27–4.2)
VITAMIN D 25-HYDROXY: 65 NG/ML (ref 30–100)
VLDLC SERPL CALC-MCNC: 15 MG/DL
WBC # BLD: 4.8 E9/L (ref 4.5–11.5)

## 2023-03-06 ENCOUNTER — OFFICE VISIT (OUTPATIENT)
Dept: PRIMARY CARE CLINIC | Age: 63
End: 2023-03-06
Payer: COMMERCIAL

## 2023-03-06 VITALS
HEIGHT: 72 IN | WEIGHT: 172 LBS | BODY MASS INDEX: 23.3 KG/M2 | OXYGEN SATURATION: 97 % | SYSTOLIC BLOOD PRESSURE: 96 MMHG | TEMPERATURE: 97.9 F | HEART RATE: 55 BPM | DIASTOLIC BLOOD PRESSURE: 70 MMHG | RESPIRATION RATE: 20 BRPM

## 2023-03-06 DIAGNOSIS — M54.50 CHRONIC BILATERAL LOW BACK PAIN WITHOUT SCIATICA: ICD-10-CM

## 2023-03-06 DIAGNOSIS — G89.29 CHRONIC BILATERAL LOW BACK PAIN WITHOUT SCIATICA: ICD-10-CM

## 2023-03-06 DIAGNOSIS — E06.3 HYPOTHYROIDISM DUE TO HASHIMOTO'S THYROIDITIS: ICD-10-CM

## 2023-03-06 DIAGNOSIS — E03.8 HYPOTHYROIDISM DUE TO HASHIMOTO'S THYROIDITIS: ICD-10-CM

## 2023-03-06 DIAGNOSIS — E78.2 MIXED HYPERLIPIDEMIA: ICD-10-CM

## 2023-03-06 PROCEDURE — 99213 OFFICE O/P EST LOW 20 MIN: CPT | Performed by: FAMILY MEDICINE

## 2023-03-06 PROCEDURE — 1123F ACP DISCUSS/DSCN MKR DOCD: CPT | Performed by: FAMILY MEDICINE

## 2023-03-06 RX ORDER — MELOXICAM 15 MG/1
15 TABLET ORAL DAILY
Qty: 30 TABLET | Refills: 1 | Status: SHIPPED | OUTPATIENT
Start: 2023-03-06

## 2023-03-06 RX ORDER — LEVOTHYROXINE SODIUM 0.2 MG/1
200 TABLET ORAL DAILY
Qty: 90 TABLET | Refills: 2 | Status: SHIPPED | OUTPATIENT
Start: 2023-03-06

## 2023-03-06 RX ORDER — PRAVASTATIN SODIUM 40 MG
40 TABLET ORAL DAILY
Qty: 90 TABLET | Refills: 2 | Status: SHIPPED | OUTPATIENT
Start: 2023-03-06

## 2023-03-06 SDOH — ECONOMIC STABILITY: INCOME INSECURITY: HOW HARD IS IT FOR YOU TO PAY FOR THE VERY BASICS LIKE FOOD, HOUSING, MEDICAL CARE, AND HEATING?: NOT HARD AT ALL

## 2023-03-06 SDOH — ECONOMIC STABILITY: FOOD INSECURITY: WITHIN THE PAST 12 MONTHS, YOU WORRIED THAT YOUR FOOD WOULD RUN OUT BEFORE YOU GOT MONEY TO BUY MORE.: NEVER TRUE

## 2023-03-06 SDOH — ECONOMIC STABILITY: HOUSING INSECURITY
IN THE LAST 12 MONTHS, WAS THERE A TIME WHEN YOU DID NOT HAVE A STEADY PLACE TO SLEEP OR SLEPT IN A SHELTER (INCLUDING NOW)?: NO

## 2023-03-06 SDOH — ECONOMIC STABILITY: FOOD INSECURITY: WITHIN THE PAST 12 MONTHS, THE FOOD YOU BOUGHT JUST DIDN'T LAST AND YOU DIDN'T HAVE MONEY TO GET MORE.: NEVER TRUE

## 2023-03-06 ASSESSMENT — PATIENT HEALTH QUESTIONNAIRE - PHQ9
SUM OF ALL RESPONSES TO PHQ QUESTIONS 1-9: 0
1. LITTLE INTEREST OR PLEASURE IN DOING THINGS: 0
SUM OF ALL RESPONSES TO PHQ QUESTIONS 1-9: 0
SUM OF ALL RESPONSES TO PHQ9 QUESTIONS 1 & 2: 0
SUM OF ALL RESPONSES TO PHQ QUESTIONS 1-9: 0
2. FEELING DOWN, DEPRESSED OR HOPELESS: 0
SUM OF ALL RESPONSES TO PHQ QUESTIONS 1-9: 0

## 2023-03-06 ASSESSMENT — ENCOUNTER SYMPTOMS
ABDOMINAL PAIN: 0
CONSTIPATION: 0
SHORTNESS OF BREATH: 0
SORE THROAT: 0
DIARRHEA: 0
NAUSEA: 0
VOMITING: 0
RHINORRHEA: 0
WHEEZING: 0

## 2023-03-06 NOTE — PROGRESS NOTES
3/6/2023     Chief Complaint   Patient presents with    Hypothyroidism       HPI  Sakina Veras (:  1960) is a 70 y.o. male, here for evaluation of the following medical concerns:    Patient is a 15-year-old male with a past medical history of hypothyroidism, arthritis, hyperlipidemia who presents today to f/u his establish care apt and lab. Hypothyroidism: Patient on Synthroid 200 mcg daily. Hyperlipidemia: Patient is on pravastatin 40 mg daily. Tolerating this medication well without any issues. Patient does regularly exercise and run. BMI 23. History of mild panic attacks at times. Previously used Valium. No recent issues. Chronic low back pain/arthritis: Patient does use NSAIDs, topical pain/ointments, and Mobic at times. Labs: CMP within normal limits besides elevated glucose. Total cholesterol 183. Triglycerides 74. HDL 56. . Hemoglobin A1c 5.3%. Vitamin D 65. TSH 0.1. Free T41.69. BC within normal limits. Review of Systems   Constitutional:  Negative for chills and fever. HENT:  Negative for congestion, rhinorrhea and sore throat. Respiratory:  Negative for shortness of breath and wheezing. Cardiovascular:  Negative for chest pain and leg swelling. Gastrointestinal:  Negative for abdominal pain, constipation, diarrhea, nausea and vomiting. Skin:  Negative for rash. Neurological:  Negative for light-headedness and headaches. Past Medical History:   Diagnosis Date    Hyperlipidemia     Thyroid disease        Prior to Visit Medications    Medication Sig Taking?  Authorizing Provider   pravastatin (PRAVACHOL) 40 MG tablet Take 1 tablet by mouth daily Yes Gutierrez Albarran MD   meloxicam (MOBIC) 15 MG tablet Take 1 tablet by mouth daily Yes Gutierrez Albarran MD   levothyroxine (SYNTHROID) 200 MCG tablet Take 1 tablet by mouth Daily Yes Gutierrez Albarran MD        No Known Allergies    Social History     Tobacco Use    Smoking status: Never    Smokeless tobacco: Never   Substance Use Topics    Alcohol use: No           Vitals:    03/06/23 1611   BP: 96/70   Pulse: 55   Resp: 20   Temp: 97.9 °F (36.6 °C)   TempSrc: Temporal   SpO2: 97%   Weight: 172 lb (78 kg)   Height: 6' (1.829 m)     Estimated body mass index is 23.33 kg/m² as calculated from the following:    Height as of this encounter: 6' (1.829 m). Weight as of this encounter: 172 lb (78 kg). Physical Exam  Constitutional:       Appearance: He is well-developed. HENT:      Head: Normocephalic. Eyes:      Extraocular Movements: Extraocular movements intact. Conjunctiva/sclera: Conjunctivae normal.   Cardiovascular:      Rate and Rhythm: Normal rate and regular rhythm. Heart sounds: Normal heart sounds. No murmur heard. Pulmonary:      Effort: Pulmonary effort is normal.      Breath sounds: Normal breath sounds. No wheezing or rales. Abdominal:      General: Bowel sounds are normal.      Palpations: Abdomen is soft. Tenderness: There is no abdominal tenderness. Musculoskeletal:      Right lower leg: No edema. Left lower leg: No edema. Neurological:      General: No focal deficit present. Mental Status: He is alert. Comments: Cranial nerves grossly intact   Psychiatric:         Mood and Affect: Mood normal.         Judgment: Judgment normal.       ASSESSMENT/PLAN:    Labs: CMP within normal limits besides elevated glucose. Total cholesterol 183. Triglycerides 74. HDL 56. . Hemoglobin A1c 5.3%. Vitamin D 65. TSH 0.1. Free T41.69. BC within normal limits. Ivonne Lebron was seen today for hypothyroidism. Diagnoses and all orders for this visit:    Mixed hyperlipidemia  -     pravastatin (PRAVACHOL) 40 MG tablet; Take 1 tablet by mouth daily    Chronic bilateral low back pain without sciatica  -     meloxicam (MOBIC) 15 MG tablet;  Take 1 tablet by mouth daily    Hypothyroidism due to Hashimoto's thyroiditis  -     levothyroxine (SYNTHROID) 200 MCG tablet; Take 1 tablet by mouth Daily    Above medical issues controlled and stable. Labs reviewed. Medications refilled. Tolerating the above medications well w/o issue. Plan for a follow-up in 6 months with a new PCP. Return in about 6 months (around 9/6/2023) for Establish with new PCP. An [employer] was used to authenticate this note.     --Hayde Cleary MD on 3/6/23 at 9:48 AM EST

## 2023-03-10 ENCOUNTER — OFFICE VISIT (OUTPATIENT)
Dept: FAMILY MEDICINE CLINIC | Age: 63
End: 2023-03-10
Payer: COMMERCIAL

## 2023-03-10 VITALS
OXYGEN SATURATION: 99 % | BODY MASS INDEX: 24.03 KG/M2 | SYSTOLIC BLOOD PRESSURE: 112 MMHG | TEMPERATURE: 97.7 F | DIASTOLIC BLOOD PRESSURE: 64 MMHG | HEART RATE: 59 BPM | WEIGHT: 177.2 LBS

## 2023-03-10 DIAGNOSIS — R09.82 POSTNASAL DRIP: ICD-10-CM

## 2023-03-10 DIAGNOSIS — R09.81 NASAL CONGESTION: ICD-10-CM

## 2023-03-10 DIAGNOSIS — J01.90 ACUTE NON-RECURRENT SINUSITIS, UNSPECIFIED LOCATION: Primary | ICD-10-CM

## 2023-03-10 PROCEDURE — 1123F ACP DISCUSS/DSCN MKR DOCD: CPT | Performed by: PHYSICIAN ASSISTANT

## 2023-03-10 PROCEDURE — 99203 OFFICE O/P NEW LOW 30 MIN: CPT | Performed by: PHYSICIAN ASSISTANT

## 2023-03-10 RX ORDER — AMOXICILLIN AND CLAVULANATE POTASSIUM 875; 125 MG/1; MG/1
1 TABLET, FILM COATED ORAL 2 TIMES DAILY
Qty: 20 TABLET | Refills: 0 | Status: SHIPPED | OUTPATIENT
Start: 2023-03-10 | End: 2023-03-20

## 2023-03-10 NOTE — PROGRESS NOTES
3/10/23  Gayathri Mejia : 1960 Sex: male  Age 70 y.o. Subjective:  Chief Complaint   Patient presents with    Congestion     X3 days, negative covid test at work    Headache    Drainage         HPI:   Gayathri Mejia , 70 y.o. male presents to express care for evaluation of congestion, drainage, headache    HPI  55-year-old male presents to express care for evaluation of cough, congestion, drainage. The patient has had these symptoms ongoing for the last 3 days. The patient states that he was recently in the visit with his PCP and was feeling well at that time. There were a couple people that were sick at work. The patient is not having any lightheadedness, dizziness. The patient is not currently on any antibiotics. The patient states this is pretty classic of his sinus symptoms. The patient did do COVID test at work that was negative. ROS:   Unless otherwise stated in this report the patient's positive and negative responses for review of systems for constitutional, eyes, ENT, cardiovascular, respiratory, gastrointestinal, neurological, , musculoskeletal, and integument systems and related systems to the presenting problem are either stated in the history of present illness or were not pertinent or were negative for the symptoms and/or complaints related to the presenting medical problem. Positives and pertinent negatives as per HPI. All others reviewed and are negative. PMH:     Past Medical History:   Diagnosis Date    Hyperlipidemia     Thyroid disease        Past Surgical History:   Procedure Laterality Date    FEMUR SURGERY      Julio C in place, pins removed    TOE AMPUTATION      work accident       History reviewed. No pertinent family history.     Medications:     Current Outpatient Medications:     amoxicillin-clavulanate (AUGMENTIN) 875-125 MG per tablet, Take 1 tablet by mouth 2 times daily for 10 days, Disp: 20 tablet, Rfl: 0    pravastatin (PRAVACHOL) 40 MG tablet, Take 1 tablet by mouth daily, Disp: 90 tablet, Rfl: 2    meloxicam (MOBIC) 15 MG tablet, Take 1 tablet by mouth daily, Disp: 30 tablet, Rfl: 1    levothyroxine (SYNTHROID) 200 MCG tablet, Take 1 tablet by mouth Daily, Disp: 90 tablet, Rfl: 2    Allergies:   No Known Allergies    Social History:     Social History     Tobacco Use    Smoking status: Never    Smokeless tobacco: Never   Substance Use Topics    Alcohol use: No    Drug use: No       Patient lives at home.    Physical Exam:     Vitals:    03/10/23 0945   BP: 112/64   Site: Right Upper Arm   Position: Sitting   Pulse: 59   Temp: 97.7 °F (36.5 °C)   TempSrc: Temporal   SpO2: 99%   Weight: 177 lb 3.2 oz (80.4 kg)       Exam:  Physical Exam  Nurse's notes and vital signs reviewed. The patient is not hypoxic.  ?  General: Alert, no acute distress, patient resting comfortably Patient is not toxic or lethargic.  Skin: Warm, intact, no pallor noted. There is no evidence of rash at this time.  Head: Normocephalic, atraumatic  Eye: Normal conjunctiva  Ears, Nose, Throat: Right tympanic membrane clear, left tympanic membrane clear. No drainage or discharge noted. No pre- or post-auricular tenderness, erythema, or swelling noted.   Nasal congestion, rhinorrhea, no epistaxis  Posterior oropharynx shows erythema and cobblestoning but no evidence of tonsillar hypertrophy, or exudate. the uvula is midline. No trismus or drooling is noted.   Moist mucous membranes.  Cardiovascular: Regular Rate and Rhythm  Respiratory: No acute distress, no rhonchi, wheezing or crackles noted. No stridor or retractions are noted.  Neurological: A&O x4, normal speech  Psychiatric: Cooperative       Testing:           Medical Decision Making:     Vital signs reviewed    Past medical history reviewed.    Allergies reviewed.    Medications reviewed.    Patient on arrival does not appear to be in any apparent distress or discomfort.  The patient has been seen and evaluated.  The patient does not  appear to be toxic or lethargic. The patient will be treated with Augmentin. The patient was educated on the proper dosage of motrin and tylenol and the appropriate intervals of each. The patient is to increase fluid intake over the next several days. The patient is to use OTC decongestant as needed. The patient is to return to express care or go directly to the emergency department should any of the signs or symptoms worsen. The patient is to followup with primary care physician in 2-3 days for repeat evaluation. The patient has no other questions or concerns at this time the patient will be discharged home. Clinical Impression:   Mignon Merrill was seen today for congestion, headache and drainage. Diagnoses and all orders for this visit:    Acute non-recurrent sinusitis, unspecified location    Postnasal drip    Nasal congestion    Other orders  -     amoxicillin-clavulanate (AUGMENTIN) 875-125 MG per tablet; Take 1 tablet by mouth 2 times daily for 10 days      The patient is to call for any concerns or return if any of the signs or symptoms worsen. The patient is to follow-up with PCP in the next 2-3 days for repeat evaluation repeat assessment or go directly to the emergency department.      SIGNATURE: Umang Jarrett III, PA-C

## 2023-09-11 ENCOUNTER — OFFICE VISIT (OUTPATIENT)
Dept: PRIMARY CARE CLINIC | Age: 63
End: 2023-09-11
Payer: COMMERCIAL

## 2023-09-11 VITALS
HEIGHT: 72 IN | OXYGEN SATURATION: 98 % | DIASTOLIC BLOOD PRESSURE: 76 MMHG | WEIGHT: 172.25 LBS | HEART RATE: 58 BPM | SYSTOLIC BLOOD PRESSURE: 110 MMHG | TEMPERATURE: 97.6 F | BODY MASS INDEX: 23.33 KG/M2

## 2023-09-11 DIAGNOSIS — E06.3 HYPOTHYROIDISM DUE TO HASHIMOTO'S THYROIDITIS: ICD-10-CM

## 2023-09-11 DIAGNOSIS — Z12.5 ENCOUNTER FOR SCREENING FOR MALIGNANT NEOPLASM OF PROSTATE: ICD-10-CM

## 2023-09-11 DIAGNOSIS — R73.03 PREDIABETES: ICD-10-CM

## 2023-09-11 DIAGNOSIS — E03.8 HYPOTHYROIDISM DUE TO HASHIMOTO'S THYROIDITIS: ICD-10-CM

## 2023-09-11 DIAGNOSIS — G89.29 CHRONIC BILATERAL LOW BACK PAIN WITHOUT SCIATICA: ICD-10-CM

## 2023-09-11 DIAGNOSIS — E78.2 MIXED HYPERLIPIDEMIA: ICD-10-CM

## 2023-09-11 DIAGNOSIS — Z00.00 INITIAL MEDICARE ANNUAL WELLNESS VISIT: Primary | ICD-10-CM

## 2023-09-11 DIAGNOSIS — M54.50 CHRONIC BILATERAL LOW BACK PAIN WITHOUT SCIATICA: ICD-10-CM

## 2023-09-11 PROCEDURE — 1123F ACP DISCUSS/DSCN MKR DOCD: CPT | Performed by: FAMILY MEDICINE

## 2023-09-11 PROCEDURE — G0438 PPPS, INITIAL VISIT: HCPCS | Performed by: FAMILY MEDICINE

## 2023-09-11 RX ORDER — LEVOTHYROXINE SODIUM 0.2 MG/1
200 TABLET ORAL DAILY
Qty: 90 TABLET | Refills: 2 | Status: SHIPPED | OUTPATIENT
Start: 2023-09-11

## 2023-09-11 RX ORDER — PRAVASTATIN SODIUM 40 MG
40 TABLET ORAL DAILY
Qty: 90 TABLET | Refills: 2 | Status: SHIPPED | OUTPATIENT
Start: 2023-09-11

## 2023-09-11 RX ORDER — MELOXICAM 15 MG/1
15 TABLET ORAL DAILY
Qty: 30 TABLET | Refills: 1 | Status: SHIPPED | OUTPATIENT
Start: 2023-09-11

## 2023-09-11 ASSESSMENT — PATIENT HEALTH QUESTIONNAIRE - PHQ9
SUM OF ALL RESPONSES TO PHQ QUESTIONS 1-9: 0
SUM OF ALL RESPONSES TO PHQ QUESTIONS 1-9: 0
2. FEELING DOWN, DEPRESSED OR HOPELESS: 0
SUM OF ALL RESPONSES TO PHQ QUESTIONS 1-9: 0
SUM OF ALL RESPONSES TO PHQ QUESTIONS 1-9: 0
1. LITTLE INTEREST OR PLEASURE IN DOING THINGS: 0
SUM OF ALL RESPONSES TO PHQ9 QUESTIONS 1 & 2: 0

## 2023-09-11 ASSESSMENT — LIFESTYLE VARIABLES
HOW MANY STANDARD DRINKS CONTAINING ALCOHOL DO YOU HAVE ON A TYPICAL DAY: PATIENT DOES NOT DRINK
HOW OFTEN DO YOU HAVE A DRINK CONTAINING ALCOHOL: NEVER

## 2023-09-11 NOTE — PROGRESS NOTES
Medicare Annual Wellness Visit    Liz Hager is here for Medicare AWV    Assessment & Plan   Initial Medicare annual wellness visit  -     CBC with Auto Differential; Future  -     T4, Free; Future  -     Uric Acid; Future  -     PSA Screening; Future  -     Vitamin B12 & Folate; Future  -     TSH; Future  -     Hepatic Function Panel; Future  -     Basic Metabolic Panel; Future  -     Lipid Panel; Future  -     Hemoglobin A1C; Future  -     Urinalysis with Microscopic; Future  -     Microalbumin, Ur; Future  Hypothyroidism due to Hashimoto's thyroiditis  -     levothyroxine (SYNTHROID) 200 MCG tablet; Take 1 tablet by mouth Daily, Disp-90 tablet, R-2Normal  -     CBC with Auto Differential; Future  -     T4, Free; Future  -     Uric Acid; Future  -     PSA Screening; Future  -     Vitamin B12 & Folate; Future  -     TSH; Future  -     Hepatic Function Panel; Future  -     Basic Metabolic Panel; Future  -     Lipid Panel; Future  -     Hemoglobin A1C; Future  -     Urinalysis with Microscopic; Future  -     Microalbumin, Ur; Future  Chronic bilateral low back pain without sciatica  -     meloxicam (MOBIC) 15 MG tablet; Take 1 tablet by mouth daily, Disp-30 tablet, R-1Normal  -     CBC with Auto Differential; Future  -     T4, Free; Future  -     Uric Acid; Future  -     PSA Screening; Future  -     Vitamin B12 & Folate; Future  -     TSH; Future  -     Hepatic Function Panel; Future  -     Basic Metabolic Panel; Future  -     Lipid Panel; Future  -     Hemoglobin A1C; Future  -     Urinalysis with Microscopic; Future  -     Microalbumin, Ur; Future  Mixed hyperlipidemia  -     pravastatin (PRAVACHOL) 40 MG tablet; Take 1 tablet by mouth daily, Disp-90 tablet, R-2Normal  -     CBC with Auto Differential; Future  -     T4, Free; Future  -     Uric Acid; Future  -     PSA Screening; Future  -     Vitamin B12 & Folate; Future  -     TSH; Future  -     Hepatic Function Panel;  Future  -     Basic Metabolic Panel;

## 2023-09-11 NOTE — PATIENT INSTRUCTIONS
family, and various assessments and screenings as appropriate. After reviewing your medical record and screening and assessments performed today your provider may have ordered immunizations, labs, imaging, and/or referrals for you. A list of these orders (if applicable) as well as your Preventive Care list are included within your After Visit Summary for your review. Other Preventive Recommendations:    A preventive eye exam performed by an eye specialist is recommended every 1-2 years to screen for glaucoma; cataracts, macular degeneration, and other eye disorders. A preventive dental visit is recommended every 6 months. Try to get at least 150 minutes of exercise per week or 10,000 steps per day on a pedometer . Order or download the FREE \"Exercise & Physical Activity: Your Everyday Guide\" from [employer]. Call 5-289.122.1418 or search [employer] online. You need 1979-7858 mg of calcium and 0262-6141 IU of vitamin D per day. It is possible to meet your calcium requirement with diet alone, but a vitamin D supplement is usually necessary to meet this goal.  When exposed to the sun, use a sunscreen that protects against both UVA and UVB radiation with an SPF of 30 or greater. Reapply every 2 to 3 hours or after sweating, drying off with a towel, or swimming. Always wear a seat belt when traveling in a car. Always wear a helmet when riding a bicycle or motorcycle.

## 2024-02-08 ENCOUNTER — OFFICE VISIT (OUTPATIENT)
Dept: PRIMARY CARE CLINIC | Age: 64
End: 2024-02-08
Payer: COMMERCIAL

## 2024-02-08 VITALS
HEIGHT: 72 IN | SYSTOLIC BLOOD PRESSURE: 100 MMHG | WEIGHT: 174 LBS | TEMPERATURE: 97.8 F | DIASTOLIC BLOOD PRESSURE: 68 MMHG | BODY MASS INDEX: 23.57 KG/M2

## 2024-02-08 DIAGNOSIS — E03.8 HYPOTHYROIDISM DUE TO HASHIMOTO'S THYROIDITIS: ICD-10-CM

## 2024-02-08 DIAGNOSIS — G89.29 CHRONIC PAIN OF BOTH KNEES: ICD-10-CM

## 2024-02-08 DIAGNOSIS — E78.2 MIXED HYPERLIPIDEMIA: ICD-10-CM

## 2024-02-08 DIAGNOSIS — R05.1 ACUTE COUGH: ICD-10-CM

## 2024-02-08 DIAGNOSIS — M25.562 CHRONIC PAIN OF BOTH KNEES: ICD-10-CM

## 2024-02-08 DIAGNOSIS — E06.3 HYPOTHYROIDISM DUE TO HASHIMOTO'S THYROIDITIS: ICD-10-CM

## 2024-02-08 DIAGNOSIS — K40.90 RIGHT INGUINAL HERNIA: ICD-10-CM

## 2024-02-08 DIAGNOSIS — M25.561 CHRONIC PAIN OF BOTH KNEES: ICD-10-CM

## 2024-02-08 DIAGNOSIS — G89.29 CHRONIC BILATERAL LOW BACK PAIN WITHOUT SCIATICA: Primary | ICD-10-CM

## 2024-02-08 DIAGNOSIS — M54.50 CHRONIC BILATERAL LOW BACK PAIN WITHOUT SCIATICA: Primary | ICD-10-CM

## 2024-02-08 PROCEDURE — 99214 OFFICE O/P EST MOD 30 MIN: CPT | Performed by: FAMILY MEDICINE

## 2024-02-08 PROCEDURE — 1123F ACP DISCUSS/DSCN MKR DOCD: CPT | Performed by: FAMILY MEDICINE

## 2024-02-08 RX ORDER — MELOXICAM 15 MG/1
15 TABLET ORAL DAILY
Qty: 30 TABLET | Refills: 1 | Status: SHIPPED | OUTPATIENT
Start: 2024-02-08

## 2024-02-08 RX ORDER — LEVOTHYROXINE SODIUM 0.2 MG/1
200 TABLET ORAL DAILY
Qty: 90 TABLET | Refills: 2 | Status: SHIPPED | OUTPATIENT
Start: 2024-02-08

## 2024-02-08 RX ORDER — PRAVASTATIN SODIUM 40 MG
40 TABLET ORAL DAILY
Qty: 90 TABLET | Refills: 2 | Status: SHIPPED | OUTPATIENT
Start: 2024-02-08

## 2024-02-08 RX ORDER — PREDNISONE 10 MG/1
TABLET ORAL
Qty: 30 TABLET | Refills: 0 | Status: SHIPPED | OUTPATIENT
Start: 2024-02-08

## 2024-02-08 RX ORDER — AZITHROMYCIN 250 MG/1
250 TABLET, FILM COATED ORAL SEE ADMIN INSTRUCTIONS
Qty: 6 TABLET | Refills: 0 | Status: SHIPPED | OUTPATIENT
Start: 2024-02-08 | End: 2024-02-13

## 2024-02-08 RX ORDER — DEXAMETHASONE SODIUM PHOSPHATE 10 MG/ML
10 INJECTION INTRAMUSCULAR; INTRAVENOUS ONCE
Status: COMPLETED | OUTPATIENT
Start: 2024-02-08 | End: 2024-02-08

## 2024-02-08 RX ADMIN — DEXAMETHASONE SODIUM PHOSPHATE 10 MG: 10 INJECTION INTRAMUSCULAR; INTRAVENOUS at 10:33

## 2024-02-08 ASSESSMENT — ENCOUNTER SYMPTOMS
GASTROINTESTINAL NEGATIVE: 1
ABDOMINAL PAIN: 0
VOMITING: 0
PHOTOPHOBIA: 0
SHORTNESS OF BREATH: 0
COUGH: 1
SORE THROAT: 0
CONSTIPATION: 0
NAUSEA: 0
BLOOD IN STOOL: 0
DIARRHEA: 0
BACK PAIN: 1

## 2024-02-08 ASSESSMENT — PATIENT HEALTH QUESTIONNAIRE - PHQ9
SUM OF ALL RESPONSES TO PHQ QUESTIONS 1-9: 0
1. LITTLE INTEREST OR PLEASURE IN DOING THINGS: 0
2. FEELING DOWN, DEPRESSED OR HOPELESS: 0
SUM OF ALL RESPONSES TO PHQ QUESTIONS 1-9: 0
SUM OF ALL RESPONSES TO PHQ9 QUESTIONS 1 & 2: 0

## 2024-02-08 NOTE — PROGRESS NOTES
Oliver Walton (:  1960) is a 64 y.o. male,Established patient, here for evaluation of the following chief complaint(s):  Back Pain (X2 weeks), Cough, and Congestion         ASSESSMENT/PLAN:  1. Chronic bilateral low back pain without sciatica  -     meloxicam (MOBIC) 15 MG tablet; Take 1 tablet by mouth daily, Disp-30 tablet, R-1Normal  -     XR LUMBAR SPINE (2-3 VIEWS); Future  2. Mixed hyperlipidemia  -     pravastatin (PRAVACHOL) 40 MG tablet; Take 1 tablet by mouth daily, Disp-90 tablet, R-2Normal  3. Hypothyroidism due to Hashimoto's thyroiditis  -     levothyroxine (SYNTHROID) 200 MCG tablet; Take 1 tablet by mouth Daily, Disp-90 tablet, R-2Normal  4. Acute cough  -     XR CHEST (2 VW); Future  -     azithromycin (ZITHROMAX) 250 MG tablet; Take 1 tablet by mouth See Admin Instructions for 5 days 500mg on day 1 followed by 250mg on days 2 - 5, Disp-6 tablet, R-0Normal  -     predniSONE (DELTASONE) 10 MG tablet; Take 4 tabs x 3 days, 3 tabs x 3 days, 2 tabs x 3 days, 1 tab x 3 days, stop., Disp-30 tablet, R-0Normal  5. Right inguinal hernia  -     XR HIP RIGHT (2-3 VIEWS); Future  6. Chronic pain of both knees  At this time we will treat symptomatically for the chronic cough.  Chest x-ray ordered as well to rule out underlying pneumonia.  X-ray of the lower back and right hip ordered today based on patient complaints.  Further evaluation will be based on results.  Does have follow-up appointment the beginning of next month.    Initial review of chest x-ray shows no acute issue at this time.  Review of right hip x-ray does show underlying arthritis with callus formation.  Intramedullary nagi is also broken.  Lumbar spine shows significant degenerative disc disease and severe arthritic changes throughout.  Patient will most likely need referral to specialist for further evaluation and treatment of his complaints.    No follow-ups on file.         Subjective   SUBJECTIVE/OBJECTIVE:  HPI  Patient presents

## 2024-02-13 DIAGNOSIS — M54.50 CHRONIC BILATERAL LOW BACK PAIN WITHOUT SCIATICA: Primary | ICD-10-CM

## 2024-02-13 DIAGNOSIS — G89.29 CHRONIC BILATERAL LOW BACK PAIN WITHOUT SCIATICA: Primary | ICD-10-CM

## 2024-03-11 ENCOUNTER — OFFICE VISIT (OUTPATIENT)
Dept: PAIN MANAGEMENT | Age: 64
End: 2024-03-11
Payer: COMMERCIAL

## 2024-03-11 ENCOUNTER — OFFICE VISIT (OUTPATIENT)
Dept: PRIMARY CARE CLINIC | Age: 64
End: 2024-03-11
Payer: COMMERCIAL

## 2024-03-11 VITALS
TEMPERATURE: 97.7 F | OXYGEN SATURATION: 95 % | BODY MASS INDEX: 24.38 KG/M2 | WEIGHT: 180 LBS | HEIGHT: 72 IN | DIASTOLIC BLOOD PRESSURE: 66 MMHG | HEART RATE: 67 BPM | SYSTOLIC BLOOD PRESSURE: 120 MMHG

## 2024-03-11 VITALS
HEART RATE: 57 BPM | WEIGHT: 170 LBS | OXYGEN SATURATION: 95 % | BODY MASS INDEX: 23.03 KG/M2 | DIASTOLIC BLOOD PRESSURE: 77 MMHG | TEMPERATURE: 97.5 F | SYSTOLIC BLOOD PRESSURE: 143 MMHG | RESPIRATION RATE: 16 BRPM | HEIGHT: 72 IN

## 2024-03-11 DIAGNOSIS — M51.36 DDD (DEGENERATIVE DISC DISEASE), LUMBAR: ICD-10-CM

## 2024-03-11 DIAGNOSIS — M47.816 LUMBAR SPONDYLOSIS: ICD-10-CM

## 2024-03-11 DIAGNOSIS — K40.90 RIGHT INGUINAL HERNIA: Primary | ICD-10-CM

## 2024-03-11 DIAGNOSIS — E06.3 HYPOTHYROIDISM DUE TO HASHIMOTO'S THYROIDITIS: ICD-10-CM

## 2024-03-11 DIAGNOSIS — E03.8 HYPOTHYROIDISM DUE TO HASHIMOTO'S THYROIDITIS: ICD-10-CM

## 2024-03-11 DIAGNOSIS — M47.817 LUMBOSACRAL SPONDYLOSIS WITHOUT MYELOPATHY: Primary | ICD-10-CM

## 2024-03-11 DIAGNOSIS — M48.061 SPINAL STENOSIS OF LUMBAR REGION, UNSPECIFIED WHETHER NEUROGENIC CLAUDICATION PRESENT: ICD-10-CM

## 2024-03-11 PROCEDURE — 1123F ACP DISCUSS/DSCN MKR DOCD: CPT | Performed by: ANESTHESIOLOGY

## 2024-03-11 PROCEDURE — 99204 OFFICE O/P NEW MOD 45 MIN: CPT | Performed by: ANESTHESIOLOGY

## 2024-03-11 PROCEDURE — 99213 OFFICE O/P EST LOW 20 MIN: CPT | Performed by: FAMILY MEDICINE

## 2024-03-11 PROCEDURE — 1123F ACP DISCUSS/DSCN MKR DOCD: CPT | Performed by: FAMILY MEDICINE

## 2024-03-11 RX ORDER — PRAVASTATIN SODIUM 10 MG
TABLET ORAL
COMMUNITY
End: 2024-03-11

## 2024-03-11 ASSESSMENT — ENCOUNTER SYMPTOMS
SORE THROAT: 0
BLOOD IN STOOL: 0
VOMITING: 0
DIARRHEA: 0
NAUSEA: 0
PHOTOPHOBIA: 0
CONSTIPATION: 0
BACK PAIN: 1
SHORTNESS OF BREATH: 0

## 2024-03-11 NOTE — PROGRESS NOTES
Oliver Walton (:  1960) is a 64 y.o. male,Established patient, here for evaluation of the following chief complaint(s):  6 Month Follow-Up         ASSESSMENT/PLAN:  1. Right inguinal hernia  -     Sam Bertrand MD, General Surgery, [facility]  2. Hypothyroidism due to Hashimoto's thyroiditis  3. Lumbar spondylosis  4. DDD (degenerative disc disease), lumbar  Currently following with pain management and waiting for BRENDAN.  We did discuss the fractured orthopedic hardware however he would like to get the back straightened out first and the hernia.  Will refer to general surgery for inguinal hernia evaluation and possible repair.  Baseline labs ordered today.  See him back in 6 months or sooner if needed for preoperative clearance.    Return in about 6 months (around 2024).         Subjective   SUBJECTIVE/OBJECTIVE:  HPI  Patient presents today for evaluation of multiple issues.  Has been having continued cough and congestion for the last 4 weeks.  No recent travel or sick contact prior to symptoms beginning.  No chest pain or shortness of breath.  Mildly productive sputum without color.  No hemoptysis.  No fever or chills.  No nausea vomiting or diarrhea.    Patient also states that he has been having more issues with right sided lower back pain.  Does have a longstanding history of back pain but has had a flareup for the last several weeks.  Denies any trauma or injury.  He does continue to run and states that he may have overdid it.  Has not had imaging in quite some time of the affected regions.  Also complaining of bilateral knee pain and right hip pain.  Also longstanding issues.  Has had injections in the past but nothing recent.  No other issues at this time.      Update 3/11/2024  Patient presents today for follow-up on chronic issues and medication refills.  Patient states that he is still having issues with right-sided pain and a bulge in the groin region.  The bulge is worsened with

## 2024-03-11 NOTE — PROGRESS NOTES
Patient:  Oliver Walton,  1960  Date of Service:  3/11/24      Patient presents to [facility] with complaints of low back pain that started 5 years ago and has been getting worse.     He states the pain began following No specific cause    Pain is intermittent and is described as aching and sharp. He rates the pain as a 9/10 on his worst day , 2/10 on his best day, and a 3/10 on average on the VAS scale.     Pain does not radiate to both lower extremities. He  does not have numbness, tingling, weakness of the both lower extremities.    Alleviating factors include: nothing.  Aggravating factors include:  bending. He states that the pain does not keep him from sleeping at night.     He is not on NSAIDS and  is not on anticoagulation medications.    Previous treatments: none.      Personal Expectations from this treatment: increase activity and decrease pain    BP (!) 143/77   Pulse 57   Temp 97.5 °F (36.4 °C) (Infrared)   Resp 16   Ht 1.829 m (6')   Wt 77.1 kg (170 lb)   SpO2 95%   BMI 23.06 kg/m²     No LMP for male patient.  
procedure side effects.    Controlled Substances Monitoring: [place] reviewed.    [place] MD    Dear Dr. Borjas,   Thank you for referring Mr. Oliver Walton and allowing us to participate in his care. Please do not hesitate to contact me if you have any questions regarding his care.    Regards,  [place] MD    CC:    Abhi Borjas,   [address] [place] [address]

## 2024-03-12 PROBLEM — M51.369 DDD (DEGENERATIVE DISC DISEASE), LUMBAR: Status: ACTIVE | Noted: 2024-03-12

## 2024-03-12 PROBLEM — M50.30 DDD (DEGENERATIVE DISC DISEASE), CERVICAL: Status: ACTIVE | Noted: 2024-03-12

## 2024-03-12 PROBLEM — M51.36 DDD (DEGENERATIVE DISC DISEASE), LUMBAR: Status: ACTIVE | Noted: 2024-03-12

## 2024-03-12 PROBLEM — M47.812 CERVICAL SPONDYLOSIS WITHOUT MYELOPATHY: Status: ACTIVE | Noted: 2024-03-12

## 2024-03-12 PROBLEM — M47.816 LUMBAR SPONDYLOSIS: Status: ACTIVE | Noted: 2024-03-12

## 2024-03-12 ASSESSMENT — ENCOUNTER SYMPTOMS
COUGH: 0
ABDOMINAL PAIN: 1

## 2024-03-14 ENCOUNTER — OFFICE VISIT (OUTPATIENT)
Dept: SURGERY | Age: 64
End: 2024-03-14
Payer: COMMERCIAL

## 2024-03-14 ENCOUNTER — TELEPHONE (OUTPATIENT)
Dept: SURGERY | Age: 64
End: 2024-03-14

## 2024-03-14 VITALS
SYSTOLIC BLOOD PRESSURE: 174 MMHG | TEMPERATURE: 97.9 F | DIASTOLIC BLOOD PRESSURE: 95 MMHG | WEIGHT: 180 LBS | HEART RATE: 55 BPM | HEIGHT: 72 IN | BODY MASS INDEX: 24.38 KG/M2

## 2024-03-14 DIAGNOSIS — K40.90 UNILATERAL INGUINAL HERNIA WITHOUT OBSTRUCTION OR GANGRENE, RECURRENCE NOT SPECIFIED: Primary | ICD-10-CM

## 2024-03-14 PROCEDURE — 99204 OFFICE O/P NEW MOD 45 MIN: CPT | Performed by: SURGERY

## 2024-03-14 PROCEDURE — 1123F ACP DISCUSS/DSCN MKR DOCD: CPT | Performed by: SURGERY

## 2024-03-14 NOTE — TELEPHONE ENCOUNTER
Per the order of Dr. Lynch, patient has been scheduled for Laparoscopic possible open right inguinal hernia repair with mesh on 3.20.2024.  Patient provided with procedure information during office visit and scheduled for post op follow up appointment.  Patient instructed to please contact our office with any questions.    Procedure scheduled through [facility].  Dr. Lynch to enter orders.  +        Prior Authorization Form:      DEMOGRAPHICS:                     Patient Name:  Yang Walton  Patient :  1960            Insurance:  Payor: [employer] / Plan: [employer] / Product Type: *No Product type* /   Insurance ID Number:    Payer/Plan Subscr  Sex Relation Sub. Ins. ID Effective Group Num   1. [employer]* YANG WALTON 1960 Male Self [id number]                                     [id number]         DIAGNOSIS & PROCEDURE:                       Procedure/Operation: Laparoscopic possible open right inguinal hernia repair with mesh           CPT Code: 21055    Diagnosis:  Inguinal Hernia    ICD10 Code: K40.91    Location:  [facility]    Surgeon:  Syed    SCHEDULING INFORMATION:                          Date: 3.20.2024    Time: TBD              Anesthesia:  General                                                       Status:  Outpatient        Special Comments:         Electronically signed by Shital Molina on 3/14/2024 at 2:06 PM

## 2024-03-14 NOTE — PROGRESS NOTES
Oliver did not want to schedule a PAT appt, he prefers to come in for labs and EKG and will come prior to procedure.

## 2024-03-14 NOTE — PROGRESS NOTES
conjunctiva  Neck: supple, no masses  Lungs: CTAB, equal chest rise bilateral  Heart: Reg rate  Abdomen: soft, minimally tender, nondistended, right reducible hernia   Skin; no lesions  Gu: no cva tenderness  Extremities: extremities normal, atraumatic, no cyanosis or edema    Assessment:  64 y.o. male with right inguinal hernia     Plan:  To OR   Discussed the risk, benefits and alternatives of surgery including wound infections, bleeding, scar and hernia formation and the risks of general anesthetic including MI, CVA, sudden death or reactions to anesthetic medications. The patient understands the risks and alternatives and the possibility of converting to an open procedure. All questions were answered to the patient's satisfaction and they freely signed the consent.        Miky Lynch MD  1:16 PM  3/14/2024

## 2024-03-18 ENCOUNTER — HOSPITAL ENCOUNTER (OUTPATIENT)
Age: 64
Discharge: HOME | End: 2024-03-18
Payer: COMMERCIAL

## 2024-03-18 PROCEDURE — 93005 ELECTROCARDIOGRAM TRACING: CPT | Performed by: ANESTHESIOLOGY

## 2024-03-19 NOTE — PROGRESS NOTES
[facility]                                                                                                                    PRE OP INSTRUCTIONS FOR  Oliver Walton        Date: 3/19/2024    Date of surgery: 3/20/24   Arrival Time: Hospital will call you between 5pm and 7pm with your final arrival time for surgery. Go to front of hospital and check in at information desk.    Nothing by mouth (NPO) as instructed. May have clear liquids up to 2 hours prior to surgery. Nothing solid after midnight. Examples: water, apple juice, black coffee, plain tea    Take the following medications with a small sip of water on the morning of Surgery: Levothyroxine     Diabetics may take half the evening dose of insulin but none after midnight.  If you feel symptomatic or have low blood sugar morning of surgery drink 1-2 ounces of apple juice only. If you take a weekly insulin injection _______________, stop 7 days prior to surgery. If you take _______________, stop 3-4 days prior to surgery.    Aspirin, Ibuprofen, Advil, Naproxen, other Anti-inflammatory products should be stopped before surgery as directed by your surgeon, cardiologist, or primary care Doctor. Herbal supplements and Vitamin E should be stopped five days prior.  May take Tylenol unless instructed otherwise by your surgeon.    Check with your Doctor regarding stopping Plavix, Coumadin, Lovenox, Eliquis, Effient, or other blood thinners such as, pradaxa, lixiana, xaralto and savaysa.    Do not smoke, vape, or use illicit drugs and do not drink any alcoholic beverages 24 hours prior to surgery.    You may brush your teeth the morning of surgery.      You MUST make arrangements for a responsible adult, 18 and over, to take you home after your surgery. You will not be allowed to leave alone or drive yourself home.  You will need someone stay with you the first 24 hrs. Your surgery will be cancelled if you do not have a ride home or someone to

## 2024-03-20 ENCOUNTER — ANESTHESIA (OUTPATIENT)
Dept: OPERATING ROOM | Age: 64
End: 2024-03-20
Payer: COMMERCIAL

## 2024-03-20 ENCOUNTER — HOSPITAL ENCOUNTER (OUTPATIENT)
Age: 64
Setting detail: OUTPATIENT SURGERY
Discharge: HOME | End: 2024-03-20
Attending: SURGERY | Admitting: SURGERY
Payer: COMMERCIAL

## 2024-03-20 ENCOUNTER — ANESTHESIA EVENT (OUTPATIENT)
Dept: OPERATING ROOM | Age: 64
End: 2024-03-20
Payer: COMMERCIAL

## 2024-03-20 VITALS
HEIGHT: 72 IN | OXYGEN SATURATION: 94 % | BODY MASS INDEX: 24.11 KG/M2 | DIASTOLIC BLOOD PRESSURE: 78 MMHG | RESPIRATION RATE: 20 BRPM | SYSTOLIC BLOOD PRESSURE: 121 MMHG | HEART RATE: 56 BPM | WEIGHT: 178 LBS | TEMPERATURE: 97.5 F

## 2024-03-20 DIAGNOSIS — Z01.818 PREOP TESTING: Primary | ICD-10-CM

## 2024-03-20 DIAGNOSIS — G89.29 CHRONIC BILATERAL LOW BACK PAIN WITHOUT SCIATICA: ICD-10-CM

## 2024-03-20 DIAGNOSIS — M54.50 CHRONIC BILATERAL LOW BACK PAIN WITHOUT SCIATICA: ICD-10-CM

## 2024-03-20 LAB
ANION GAP SERPL CALCULATED.3IONS-SCNC: 13 MMOL/L (ref 7–16)
BASOPHILS # BLD: 0.03 K/UL (ref 0–0.2)
BASOPHILS NFR BLD: 1 % (ref 0–2)
BUN SERPL-MCNC: 21 MG/DL (ref 6–23)
CALCIUM SERPL-MCNC: 9.4 MG/DL (ref 8.6–10.2)
CHLORIDE SERPL-SCNC: 104 MMOL/L (ref 98–107)
CO2 SERPL-SCNC: 25 MMOL/L (ref 22–29)
CREAT SERPL-MCNC: 0.8 MG/DL (ref 0.7–1.2)
EKG ATRIAL RATE: 55 BPM
EKG P AXIS: 16 DEGREES
EKG P-R INTERVAL: 168 MS
EKG Q-T INTERVAL: 430 MS
EKG QRS DURATION: 92 MS
EKG QTC CALCULATION (BAZETT): 411 MS
EKG R AXIS: 69 DEGREES
EKG T AXIS: 32 DEGREES
EKG VENTRICULAR RATE: 55 BPM
EOSINOPHIL # BLD: 0.11 K/UL (ref 0.05–0.5)
EOSINOPHILS RELATIVE PERCENT: 2 % (ref 0–6)
ERYTHROCYTE [DISTWIDTH] IN BLOOD BY AUTOMATED COUNT: 13.2 % (ref 11.5–15)
GFR SERPL CREATININE-BSD FRML MDRD: >60 ML/MIN/1.73M2
GLUCOSE SERPL-MCNC: 107 MG/DL (ref 74–99)
HCT VFR BLD AUTO: 49.8 % (ref 37–54)
HGB BLD-MCNC: 16.9 G/DL (ref 12.5–16.5)
IMM GRANULOCYTES # BLD AUTO: 0.03 K/UL (ref 0–0.58)
IMM GRANULOCYTES NFR BLD: 1 % (ref 0–5)
LYMPHOCYTES NFR BLD: 1.57 K/UL (ref 1.5–4)
LYMPHOCYTES RELATIVE PERCENT: 29 % (ref 20–42)
MCH RBC QN AUTO: 30.8 PG (ref 26–35)
MCHC RBC AUTO-ENTMCNC: 33.9 G/DL (ref 32–34.5)
MCV RBC AUTO: 90.9 FL (ref 80–99.9)
MONOCYTES NFR BLD: 0.56 K/UL (ref 0.1–0.95)
MONOCYTES NFR BLD: 10 % (ref 2–12)
NEUTROPHILS NFR BLD: 58 % (ref 43–80)
NEUTS SEG NFR BLD: 3.2 K/UL (ref 1.8–7.3)
PLATELET # BLD AUTO: 192 K/UL (ref 130–450)
PMV BLD AUTO: 10.3 FL (ref 7–12)
POTASSIUM SERPL-SCNC: 4.6 MMOL/L (ref 3.5–5)
RBC # BLD AUTO: 5.48 M/UL (ref 3.8–5.8)
SODIUM SERPL-SCNC: 142 MMOL/L (ref 132–146)
WBC OTHER # BLD: 5.5 K/UL (ref 4.5–11.5)

## 2024-03-20 PROCEDURE — 2580000003 HC RX 258: Performed by: SURGERY

## 2024-03-20 PROCEDURE — 3600000004 HC SURGERY LEVEL 4 BASE: Performed by: SURGERY

## 2024-03-20 PROCEDURE — 6370000000 HC RX 637 (ALT 250 FOR IP): Performed by: ANESTHESIOLOGY

## 2024-03-20 PROCEDURE — 80048 BASIC METABOLIC PNL TOTAL CA: CPT

## 2024-03-20 PROCEDURE — 7100000001 HC PACU RECOVERY - ADDTL 15 MIN: Performed by: SURGERY

## 2024-03-20 PROCEDURE — 6360000002 HC RX W HCPCS: Performed by: SURGERY

## 2024-03-20 PROCEDURE — 7100000010 HC PHASE II RECOVERY - FIRST 15 MIN: Performed by: SURGERY

## 2024-03-20 PROCEDURE — C1781 MESH (IMPLANTABLE): HCPCS | Performed by: SURGERY

## 2024-03-20 PROCEDURE — 3700000000 HC ANESTHESIA ATTENDED CARE: Performed by: SURGERY

## 2024-03-20 PROCEDURE — 99024 POSTOP FOLLOW-UP VISIT: CPT | Performed by: SURGERY

## 2024-03-20 PROCEDURE — 2500000003 HC RX 250 WO HCPCS: Performed by: NURSE ANESTHETIST, CERTIFIED REGISTERED

## 2024-03-20 PROCEDURE — 7100000011 HC PHASE II RECOVERY - ADDTL 15 MIN: Performed by: SURGERY

## 2024-03-20 PROCEDURE — 7100000000 HC PACU RECOVERY - FIRST 15 MIN: Performed by: SURGERY

## 2024-03-20 PROCEDURE — 49650 LAP ING HERNIA REPAIR INIT: CPT | Performed by: SURGERY

## 2024-03-20 PROCEDURE — 3600000014 HC SURGERY LEVEL 4 ADDTL 15MIN: Performed by: SURGERY

## 2024-03-20 PROCEDURE — 6360000002 HC RX W HCPCS: Performed by: ANESTHESIOLOGY

## 2024-03-20 PROCEDURE — 2720000010 HC SURG SUPPLY STERILE: Performed by: SURGERY

## 2024-03-20 PROCEDURE — 2580000003 HC RX 258: Performed by: NURSE ANESTHETIST, CERTIFIED REGISTERED

## 2024-03-20 PROCEDURE — 85025 COMPLETE CBC W/AUTO DIFF WBC: CPT

## 2024-03-20 PROCEDURE — 2500000003 HC RX 250 WO HCPCS: Performed by: SURGERY

## 2024-03-20 PROCEDURE — 2709999900 HC NON-CHARGEABLE SUPPLY: Performed by: SURGERY

## 2024-03-20 PROCEDURE — 3700000001 HC ADD 15 MINUTES (ANESTHESIA): Performed by: SURGERY

## 2024-03-20 PROCEDURE — 6360000002 HC RX W HCPCS: Performed by: NURSE ANESTHETIST, CERTIFIED REGISTERED

## 2024-03-20 DEVICE — 3DMAX LIGHT MESH, 12.2 CM X 17.0 CM (4.8" X 6.7"), RIGHT, EXTRA LARGE
Type: IMPLANTABLE DEVICE | Site: INGUINAL | Status: FUNCTIONAL
Brand: 3DMAX

## 2024-03-20 DEVICE — 3DMAX LIGHT MESH, 12.2 CM X 17.0 CM (4.8" X 6.7"), LEFT, EXTRA LARGE
Type: IMPLANTABLE DEVICE | Site: INGUINAL | Status: FUNCTIONAL
Brand: 3DMAX

## 2024-03-20 RX ORDER — NALOXONE HYDROCHLORIDE 0.4 MG/ML
INJECTION, SOLUTION INTRAMUSCULAR; INTRAVENOUS; SUBCUTANEOUS PRN
Status: DISCONTINUED | OUTPATIENT
Start: 2024-03-20 | End: 2024-03-20 | Stop reason: HOSPADM

## 2024-03-20 RX ORDER — NEOSTIGMINE METHYLSULFATE 1 MG/ML
INJECTION, SOLUTION INTRAVENOUS PRN
Status: DISCONTINUED | OUTPATIENT
Start: 2024-03-20 | End: 2024-03-20 | Stop reason: SDUPTHER

## 2024-03-20 RX ORDER — SODIUM CHLORIDE 0.9 % (FLUSH) 0.9 %
5-40 SYRINGE (ML) INJECTION PRN
Status: DISCONTINUED | OUTPATIENT
Start: 2024-03-20 | End: 2024-03-20 | Stop reason: HOSPADM

## 2024-03-20 RX ORDER — ROCURONIUM BROMIDE 10 MG/ML
INJECTION, SOLUTION INTRAVENOUS PRN
Status: DISCONTINUED | OUTPATIENT
Start: 2024-03-20 | End: 2024-03-20 | Stop reason: SDUPTHER

## 2024-03-20 RX ORDER — METHOCARBAMOL 100 MG/ML
1000 INJECTION, SOLUTION INTRAMUSCULAR; INTRAVENOUS
Status: COMPLETED | OUTPATIENT
Start: 2024-03-20 | End: 2024-03-20

## 2024-03-20 RX ORDER — LIDOCAINE HYDROCHLORIDE 20 MG/ML
INJECTION, SOLUTION INTRAVENOUS PRN
Status: DISCONTINUED | OUTPATIENT
Start: 2024-03-20 | End: 2024-03-20 | Stop reason: SDUPTHER

## 2024-03-20 RX ORDER — LABETALOL HYDROCHLORIDE 5 MG/ML
10 INJECTION, SOLUTION INTRAVENOUS
Status: DISCONTINUED | OUTPATIENT
Start: 2024-03-20 | End: 2024-03-20 | Stop reason: HOSPADM

## 2024-03-20 RX ORDER — FENTANYL CITRATE 50 UG/ML
INJECTION, SOLUTION INTRAMUSCULAR; INTRAVENOUS PRN
Status: DISCONTINUED | OUTPATIENT
Start: 2024-03-20 | End: 2024-03-20 | Stop reason: SDUPTHER

## 2024-03-20 RX ORDER — HYDRALAZINE HYDROCHLORIDE 20 MG/ML
10 INJECTION INTRAMUSCULAR; INTRAVENOUS
Status: DISCONTINUED | OUTPATIENT
Start: 2024-03-20 | End: 2024-03-20 | Stop reason: HOSPADM

## 2024-03-20 RX ORDER — TRAMADOL HYDROCHLORIDE 50 MG/1
50 TABLET ORAL EVERY 6 HOURS PRN
Qty: 12 TABLET | Refills: 0 | Status: SHIPPED | OUTPATIENT
Start: 2024-03-20 | End: 2024-03-23

## 2024-03-20 RX ORDER — SODIUM CHLORIDE, SODIUM LACTATE, POTASSIUM CHLORIDE, CALCIUM CHLORIDE 600; 310; 30; 20 MG/100ML; MG/100ML; MG/100ML; MG/100ML
INJECTION, SOLUTION INTRAVENOUS CONTINUOUS
Status: DISCONTINUED | OUTPATIENT
Start: 2024-03-20 | End: 2024-03-20 | Stop reason: HOSPADM

## 2024-03-20 RX ORDER — METHOCARBAMOL 500 MG/1
500 TABLET, FILM COATED ORAL 4 TIMES DAILY
Qty: 30 TABLET | Refills: 0 | Status: SHIPPED | OUTPATIENT
Start: 2024-03-20 | End: 2024-03-30

## 2024-03-20 RX ORDER — FENTANYL CITRATE 0.05 MG/ML
25 INJECTION, SOLUTION INTRAMUSCULAR; INTRAVENOUS EVERY 5 MIN PRN
Status: DISCONTINUED | OUTPATIENT
Start: 2024-03-20 | End: 2024-03-20 | Stop reason: HOSPADM

## 2024-03-20 RX ORDER — PROPOFOL 10 MG/ML
INJECTION, EMULSION INTRAVENOUS PRN
Status: DISCONTINUED | OUTPATIENT
Start: 2024-03-20 | End: 2024-03-20 | Stop reason: SDUPTHER

## 2024-03-20 RX ORDER — ONDANSETRON 2 MG/ML
4 INJECTION INTRAMUSCULAR; INTRAVENOUS
Status: DISCONTINUED | OUTPATIENT
Start: 2024-03-20 | End: 2024-03-20 | Stop reason: HOSPADM

## 2024-03-20 RX ORDER — BUPIVACAINE HYDROCHLORIDE AND EPINEPHRINE 2.5; 5 MG/ML; UG/ML
INJECTION, SOLUTION EPIDURAL; INFILTRATION; INTRACAUDAL; PERINEURAL PRN
Status: DISCONTINUED | OUTPATIENT
Start: 2024-03-20 | End: 2024-03-20 | Stop reason: ALTCHOICE

## 2024-03-20 RX ORDER — MORPHINE SULFATE 2 MG/ML
2 INJECTION, SOLUTION INTRAMUSCULAR; INTRAVENOUS EVERY 5 MIN PRN
Status: DISCONTINUED | OUTPATIENT
Start: 2024-03-20 | End: 2024-03-20 | Stop reason: HOSPADM

## 2024-03-20 RX ORDER — MEPERIDINE HYDROCHLORIDE 25 MG/ML
12.5 INJECTION INTRAMUSCULAR; INTRAVENOUS; SUBCUTANEOUS EVERY 5 MIN PRN
Status: DISCONTINUED | OUTPATIENT
Start: 2024-03-20 | End: 2024-03-20 | Stop reason: HOSPADM

## 2024-03-20 RX ORDER — IPRATROPIUM BROMIDE AND ALBUTEROL SULFATE 2.5; .5 MG/3ML; MG/3ML
1 SOLUTION RESPIRATORY (INHALATION)
Status: DISCONTINUED | OUTPATIENT
Start: 2024-03-20 | End: 2024-03-20 | Stop reason: HOSPADM

## 2024-03-20 RX ORDER — ONDANSETRON 2 MG/ML
INJECTION INTRAMUSCULAR; INTRAVENOUS PRN
Status: DISCONTINUED | OUTPATIENT
Start: 2024-03-20 | End: 2024-03-20 | Stop reason: SDUPTHER

## 2024-03-20 RX ORDER — IBUPROFEN 800 MG/1
800 TABLET ORAL EVERY 6 HOURS PRN
Qty: 20 TABLET | Refills: 0 | Status: SHIPPED | OUTPATIENT
Start: 2024-03-20

## 2024-03-20 RX ORDER — SODIUM CHLORIDE 9 MG/ML
INJECTION, SOLUTION INTRAVENOUS PRN
Status: DISCONTINUED | OUTPATIENT
Start: 2024-03-20 | End: 2024-03-20 | Stop reason: HOSPADM

## 2024-03-20 RX ORDER — TRAMADOL HYDROCHLORIDE 50 MG/1
50 TABLET ORAL ONCE
Status: COMPLETED | OUTPATIENT
Start: 2024-03-20 | End: 2024-03-20

## 2024-03-20 RX ORDER — SODIUM CHLORIDE 0.9 % (FLUSH) 0.9 %
5-40 SYRINGE (ML) INJECTION EVERY 12 HOURS SCHEDULED
Status: DISCONTINUED | OUTPATIENT
Start: 2024-03-20 | End: 2024-03-20 | Stop reason: HOSPADM

## 2024-03-20 RX ORDER — PROCHLORPERAZINE EDISYLATE 5 MG/ML
5 INJECTION INTRAMUSCULAR; INTRAVENOUS
Status: DISCONTINUED | OUTPATIENT
Start: 2024-03-20 | End: 2024-03-20 | Stop reason: HOSPADM

## 2024-03-20 RX ORDER — DIPHENHYDRAMINE HYDROCHLORIDE 50 MG/ML
12.5 INJECTION INTRAMUSCULAR; INTRAVENOUS
Status: DISCONTINUED | OUTPATIENT
Start: 2024-03-20 | End: 2024-03-20 | Stop reason: HOSPADM

## 2024-03-20 RX ORDER — MIDAZOLAM HYDROCHLORIDE 1 MG/ML
INJECTION INTRAMUSCULAR; INTRAVENOUS PRN
Status: DISCONTINUED | OUTPATIENT
Start: 2024-03-20 | End: 2024-03-20 | Stop reason: SDUPTHER

## 2024-03-20 RX ORDER — SODIUM CHLORIDE, SODIUM LACTATE, POTASSIUM CHLORIDE, CALCIUM CHLORIDE 600; 310; 30; 20 MG/100ML; MG/100ML; MG/100ML; MG/100ML
INJECTION, SOLUTION INTRAVENOUS CONTINUOUS PRN
Status: DISCONTINUED | OUTPATIENT
Start: 2024-03-20 | End: 2024-03-20 | Stop reason: SDUPTHER

## 2024-03-20 RX ORDER — MIDAZOLAM HYDROCHLORIDE 1 MG/ML
2 INJECTION INTRAMUSCULAR; INTRAVENOUS
Status: DISCONTINUED | OUTPATIENT
Start: 2024-03-20 | End: 2024-03-20 | Stop reason: HOSPADM

## 2024-03-20 RX ORDER — DEXAMETHASONE SODIUM PHOSPHATE 10 MG/ML
INJECTION, SOLUTION INTRAMUSCULAR; INTRAVENOUS PRN
Status: DISCONTINUED | OUTPATIENT
Start: 2024-03-20 | End: 2024-03-20 | Stop reason: SDUPTHER

## 2024-03-20 RX ORDER — KETOROLAC TROMETHAMINE 15 MG/ML
15 INJECTION, SOLUTION INTRAMUSCULAR; INTRAVENOUS
Status: COMPLETED | OUTPATIENT
Start: 2024-03-20 | End: 2024-03-20

## 2024-03-20 RX ORDER — GLYCOPYRROLATE 1 MG/5 ML
SYRINGE (ML) INTRAVENOUS PRN
Status: DISCONTINUED | OUTPATIENT
Start: 2024-03-20 | End: 2024-03-20 | Stop reason: SDUPTHER

## 2024-03-20 RX ADMIN — Medication 0.6 MG: at 09:42

## 2024-03-20 RX ADMIN — FENTANYL CITRATE 50 MCG: 50 INJECTION, SOLUTION INTRAMUSCULAR; INTRAVENOUS at 09:42

## 2024-03-20 RX ADMIN — Medication 3 MG: at 09:42

## 2024-03-20 RX ADMIN — FENTANYL CITRATE 50 MCG: 50 INJECTION, SOLUTION INTRAMUSCULAR; INTRAVENOUS at 09:52

## 2024-03-20 RX ADMIN — ONDANSETRON 4 MG: 2 INJECTION INTRAMUSCULAR; INTRAVENOUS at 09:38

## 2024-03-20 RX ADMIN — MIDAZOLAM 2 MG: 1 INJECTION INTRAMUSCULAR; INTRAVENOUS at 09:01

## 2024-03-20 RX ADMIN — PROPOFOL 160 MG: 10 INJECTION, EMULSION INTRAVENOUS at 09:06

## 2024-03-20 RX ADMIN — TRAMADOL HYDROCHLORIDE 50 MG: 50 TABLET ORAL at 11:41

## 2024-03-20 RX ADMIN — METHOCARBAMOL 1000 MG: 100 INJECTION INTRAMUSCULAR; INTRAVENOUS at 10:02

## 2024-03-20 RX ADMIN — MORPHINE SULFATE 2 MG: 2 INJECTION, SOLUTION INTRAMUSCULAR; INTRAVENOUS at 10:29

## 2024-03-20 RX ADMIN — CEFAZOLIN 2000 MG: 2 INJECTION, POWDER, FOR SOLUTION INTRAMUSCULAR; INTRAVENOUS at 09:01

## 2024-03-20 RX ADMIN — FENTANYL CITRATE 100 MCG: 50 INJECTION, SOLUTION INTRAMUSCULAR; INTRAVENOUS at 09:06

## 2024-03-20 RX ADMIN — KETOROLAC TROMETHAMINE 15 MG: 15 INJECTION, SOLUTION INTRAMUSCULAR; INTRAVENOUS at 10:02

## 2024-03-20 RX ADMIN — ROCURONIUM BROMIDE 30 MG: 10 INJECTION, SOLUTION INTRAVENOUS at 09:06

## 2024-03-20 RX ADMIN — LIDOCAINE HYDROCHLORIDE 60 MG: 20 INJECTION, SOLUTION INTRAVENOUS at 09:06

## 2024-03-20 RX ADMIN — FENTANYL CITRATE 50 MCG: 50 INJECTION, SOLUTION INTRAMUSCULAR; INTRAVENOUS at 09:44

## 2024-03-20 RX ADMIN — SODIUM CHLORIDE, POTASSIUM CHLORIDE, SODIUM LACTATE AND CALCIUM CHLORIDE: 600; 310; 30; 20 INJECTION, SOLUTION INTRAVENOUS at 09:01

## 2024-03-20 RX ADMIN — DEXAMETHASONE SODIUM PHOSPHATE 10 MG: 10 INJECTION, SOLUTION INTRAMUSCULAR; INTRAVENOUS at 09:14

## 2024-03-20 RX ADMIN — SODIUM CHLORIDE, POTASSIUM CHLORIDE, SODIUM LACTATE AND CALCIUM CHLORIDE: 600; 310; 30; 20 INJECTION, SOLUTION INTRAVENOUS at 08:43

## 2024-03-20 ASSESSMENT — PAIN - FUNCTIONAL ASSESSMENT
PAIN_FUNCTIONAL_ASSESSMENT: 0-10
PAIN_FUNCTIONAL_ASSESSMENT: NONE - DENIES PAIN

## 2024-03-20 ASSESSMENT — PAIN DESCRIPTION - DESCRIPTORS
DESCRIPTORS: ACHING;DISCOMFORT
DESCRIPTORS: SORE
DESCRIPTORS: SORE
DESCRIPTORS: ACHING;DISCOMFORT
DESCRIPTORS: SORE
DESCRIPTORS: ACHING;DISCOMFORT

## 2024-03-20 ASSESSMENT — PAIN DESCRIPTION - LOCATION
LOCATION: ABDOMEN

## 2024-03-20 ASSESSMENT — PAIN SCALES - GENERAL
PAINLEVEL_OUTOF10: 7
PAINLEVEL_OUTOF10: 7
PAINLEVEL_OUTOF10: 5

## 2024-03-20 ASSESSMENT — PAIN DESCRIPTION - PAIN TYPE
TYPE: SURGICAL PAIN

## 2024-03-20 ASSESSMENT — PAIN DESCRIPTION - ORIENTATION
ORIENTATION: MID

## 2024-03-20 NOTE — DISCHARGE INSTRUCTIONS
Patient Discharge Instructions  Discharge Date:  3/20/2024    Discharged To:  Home    RESUME ACTIVITY:      BATHING:  May shower 24hrs after surgery, may bathe 3 days after surgery    DRIVING: No driving while on pain medications    RETURN TO WORK: after follow up appointment    WALKING:  Yes    SEXUAL ACTIVITY: Yes    STAIRS:  Yes    LIFTING: Less than 25 lbs for 2weeks then no more than 50lbs for the next two, then no limitations    DIET: common adult    BOWELS: constipation is a side effect of your pain meds, take a daily laxative (miralax, dulcolax, etc.) as needed to keep your bowels moving as they normally do, do not go 2-3 days without having a bowel movement.    SPECIAL INSTRUCTIONS:     Call the office at  if you have a fever > 100 F, or if your incision becomes red, tender, or drains more than a small amount of clear fluid.    Call  for follow up appointment with Dr. Lynch in:  2weeks

## 2024-03-20 NOTE — PROGRESS NOTES
CLINICAL PHARMACY NOTE: MEDS TO BEDS    Total # of Prescriptions Filled: 3   The following medications were delivered to the patient:  Tramadol 50 mg  Ibuprofen 800 mg  Methocarbamol 500 mg    Additional Documentation:

## 2024-03-20 NOTE — ANESTHESIA PRE PROCEDURE
discussed with patient.  Patient verbalized an understanding and agrees to proceed.  Plan discussed with care team members and agreed upon.    Gibson Roldan MD  Staff Anesthesiologist  8:47 AM      Gibson Roldan MD   3/20/2024

## 2024-03-20 NOTE — H&P
General Surgery History and Physical     Patient's Name/Date of Birth: Oliver Walton / 1960     Date: March 20, 2024      Surgeon: Miky Lynch M.D.     PCP: Abhi Borjas DO     Chief Complaint: right  inguinal hernia     HPI:   Oliver Walton is a 64 y.o. male who presents for evaluation of  right inguinal hernia this is not reducible but has become enlarging and causing more pain. Is tolerating a diet and moving bowels.         Past Medical History        Past Medical History:   Diagnosis Date    Hyperlipidemia      Thyroid disease              Past Surgical History         Past Surgical History:   Procedure Laterality Date    FEMUR SURGERY         Julio C in place, pins removed    TOE AMPUTATION         work accident            Current Facility-Administered Medications          Current Outpatient Medications   Medication Sig Dispense Refill    meloxicam (MOBIC) 15 MG tablet Take 1 tablet by mouth daily 30 tablet 1    pravastatin (PRAVACHOL) 40 MG tablet Take 1 tablet by mouth daily 90 tablet 2    levothyroxine (SYNTHROID) 200 MCG tablet Take 1 tablet by mouth Daily 90 tablet 2      No current facility-administered medications for this visit.            No Known Allergies     The patient has a family history that is negative for severe cardiovascular or respiratory issues, negative for reaction to anesthesia.     Social History               Socioeconomic History    Marital status: Single       Spouse name: Not on file    Number of children: Not on file    Years of education: Not on file    Highest education level: Not on file   Occupational History    Not on file   Tobacco Use    Smoking status: Never    Smokeless tobacco: Never   Substance and Sexual Activity    Alcohol use: No    Drug use: No    Sexual activity: Not on file   Other Topics Concern    Not on file   Social History Narrative    Not on file      Social Determinants of Health           Financial Resource Strain: Low Risk  (3/6/2023)     Overall

## 2024-03-20 NOTE — OP NOTE
began our dissection by taking the scissors and making a peritoneal   incision more proximal on the abdominal wall from the hernia site. Once we   were able to get into the preperitoneal space, we bluntly dissected out the   preperitoneal space down to the hernia sac. The hernia sac was then bluntly   dissected free from the spermatic cord and its contents. A cord lipoma was dissected free and excised. We were able to   visualize the vas deferens and pay close attention to not injure this as well   as the vasculature of the cord contents. Once we were able to reduce the   hernia and free the peritoneum from the spermatic cord, and fully reduce the   hernia, we brought in a Bard 3DMax Light mesh, introduced it through one of   the trocars and then placed it into the right inguinal area. Once this was   done, it was tacked to the pubic tubercle medially and a few other   stabilizing tacks were placed throughout the mesh. These were paid close   attention to not place any so-called \"triangle of doom\" or \"triangle of pain\".   Once this was completed, the peritoneum was then tacked upon itself to close   over the mesh to assure no bowel would come in contact with the mesh.   This was repeated on the left.  Once this was completed and we were content with the placement of the mesh,   the pneumoperitoneum was evacuated. The trocars were removed. At that   point, the skin was approximated in all trocar sites with 4-0 Monocryl   sutures in a subcuticular fashion. The patient was woken up in stable   condition and taken to PACU for postoperative care.      Miky Lynch MD  9:40 AM  3/20/2024

## 2024-03-20 NOTE — ANESTHESIA POSTPROCEDURE EVALUATION
Department of Anesthesiology  Postprocedure Note    Patient: Oliver Walton  MRN: [id number]  YOB: 1960  Date of evaluation: 3/20/2024    Procedure Summary       Date: 03/20/24 Room / Location: [address]    Anesthesia Start: 0859 Anesthesia Stop: 0954    Procedure: LAPAROSCOPIC  BILATERAL INGUINAL HERNIA REPAIR WITH MESH (Right: Abdomen) Diagnosis:       Right inguinal hernia      (Right inguinal hernia [K40.90])    Surgeons: Miky Lynch MD Responsible Provider: Gibson Roldan MD    Anesthesia Type: general ASA Status: 2            Anesthesia Type: No value filed.    Fabio Phase I: Fabio Score: 10    Fabio Phase II: Fabio Score: 10    Anesthesia Post Evaluation    Patient location during evaluation: PACU  Patient participation: complete - patient participated  Level of consciousness: awake  Airway patency: patent  Nausea & Vomiting: no nausea and no vomiting  Cardiovascular status: hemodynamically stable  Respiratory status: acceptable  Hydration status: euvolemic  Pain management: adequate    No notable events documented.

## 2024-04-15 ENCOUNTER — OFFICE VISIT (OUTPATIENT)
Dept: SURGERY | Age: 64
End: 2024-04-15

## 2024-04-15 VITALS
HEART RATE: 64 BPM | HEIGHT: 72 IN | SYSTOLIC BLOOD PRESSURE: 141 MMHG | WEIGHT: 178 LBS | BODY MASS INDEX: 24.11 KG/M2 | DIASTOLIC BLOOD PRESSURE: 90 MMHG | TEMPERATURE: 98.2 F

## 2024-04-15 DIAGNOSIS — K40.90 UNILATERAL INGUINAL HERNIA WITHOUT OBSTRUCTION OR GANGRENE, RECURRENCE NOT SPECIFIED: Primary | ICD-10-CM

## 2024-04-15 PROCEDURE — 99024 POSTOP FOLLOW-UP VISIT: CPT | Performed by: SURGERY

## 2024-04-15 NOTE — PROGRESS NOTES
Surgery Progress Note            Chief complaint:   Patient Active Problem List   Diagnosis    Chronic bilateral low back pain without sciatica    Hypothyroidism due to Hashimoto's thyroiditis    Right inguinal hernia    Cervical spondylosis without myelopathy    DDD (degenerative disc disease), cervical    DDD (degenerative disc disease), lumbar    Lumbar spondylosis       S: doing well    O:   Vitals:    04/15/24 1538   BP: (!) 141/90   Pulse: 64   Temp: 98.2 °F (36.8 °C)     No intake or output data in the 24 hours ending 04/15/24 1555        Labs:  No results for input(s): \"WBC\", \"HGB\", \"HCT\" in the last 72 hours.    Invalid input(s): \"PLR\"  Lab Results   Component Value Date    CREATININE 0.8 03/20/2024    BUN 21 03/20/2024     03/20/2024    K 4.6 03/20/2024     03/20/2024    CO2 25 03/20/2024     No results for input(s): \"LIPASE\", \"AMYLASE\" in the last 72 hours.    Physical exam:   BP (!) 141/90   Pulse 64   Temp 98.2 °F (36.8 °C) (Temporal)   Ht 1.829 m (6')   Wt 80.7 kg (178 lb)   BMI 24.14 kg/m²   General appearance: NAD  Head: NCAT  Neck: supple, no masses  Lungs: equal chest rise bilateral  Heart: S1S2 present  Abdomen: soft, nontender, nondistended  Skin; no new lesions, incisions clean and intact  Gu: no cva tenderness  Extremities: extremities normal, atraumatic, no cyanosis or edema    A:  Post op lap bilateral inguinal hernia repair with mesh    P: follow up as needed.     Miky Lynch MD, MD  4/15/2024

## 2024-04-16 ENCOUNTER — HOSPITAL ENCOUNTER (OUTPATIENT)
Dept: MRI IMAGING | Age: 64
Discharge: HOME | End: 2024-04-18
Attending: ANESTHESIOLOGY
Payer: COMMERCIAL

## 2024-04-16 DIAGNOSIS — M48.061 SPINAL STENOSIS OF LUMBAR REGION, UNSPECIFIED WHETHER NEUROGENIC CLAUDICATION PRESENT: ICD-10-CM

## 2024-04-16 DIAGNOSIS — M51.36 DDD (DEGENERATIVE DISC DISEASE), LUMBAR: ICD-10-CM

## 2024-04-16 DIAGNOSIS — M47.817 LUMBOSACRAL SPONDYLOSIS WITHOUT MYELOPATHY: ICD-10-CM

## 2024-04-16 PROCEDURE — 72148 MRI LUMBAR SPINE W/O DYE: CPT

## 2024-05-01 ENCOUNTER — OFFICE VISIT (OUTPATIENT)
Dept: PAIN MANAGEMENT | Age: 64
End: 2024-05-01
Payer: COMMERCIAL

## 2024-05-01 ENCOUNTER — PREP FOR PROCEDURE (OUTPATIENT)
Dept: PAIN MANAGEMENT | Age: 64
End: 2024-05-01

## 2024-05-01 VITALS
TEMPERATURE: 97.2 F | RESPIRATION RATE: 16 BRPM | DIASTOLIC BLOOD PRESSURE: 86 MMHG | BODY MASS INDEX: 23.57 KG/M2 | SYSTOLIC BLOOD PRESSURE: 137 MMHG | HEIGHT: 72 IN | OXYGEN SATURATION: 95 % | WEIGHT: 174 LBS | HEART RATE: 59 BPM

## 2024-05-01 DIAGNOSIS — M48.061 SPINAL STENOSIS OF LUMBAR REGION, UNSPECIFIED WHETHER NEUROGENIC CLAUDICATION PRESENT: ICD-10-CM

## 2024-05-01 DIAGNOSIS — M47.817 LUMBOSACRAL SPONDYLOSIS WITHOUT MYELOPATHY: Primary | ICD-10-CM

## 2024-05-01 DIAGNOSIS — M17.0 OSTEOARTHRITIS OF BOTH KNEES, UNSPECIFIED OSTEOARTHRITIS TYPE: ICD-10-CM

## 2024-05-01 DIAGNOSIS — M51.36 DDD (DEGENERATIVE DISC DISEASE), LUMBAR: ICD-10-CM

## 2024-05-01 PROCEDURE — 99214 OFFICE O/P EST MOD 30 MIN: CPT

## 2024-05-01 PROCEDURE — 99214 OFFICE O/P EST MOD 30 MIN: CPT | Performed by: ANESTHESIOLOGY

## 2024-05-01 PROCEDURE — 1123F ACP DISCUSS/DSCN MKR DOCD: CPT | Performed by: ANESTHESIOLOGY

## 2024-05-01 RX ORDER — SODIUM CHLORIDE 0.9 % (FLUSH) 0.9 %
5-40 SYRINGE (ML) INJECTION PRN
Status: CANCELLED | OUTPATIENT
Start: 2024-05-01

## 2024-05-01 RX ORDER — SODIUM CHLORIDE 0.9 % (FLUSH) 0.9 %
5-40 SYRINGE (ML) INJECTION EVERY 12 HOURS SCHEDULED
Status: CANCELLED | OUTPATIENT
Start: 2024-05-01

## 2024-05-01 RX ORDER — SODIUM CHLORIDE 9 MG/ML
INJECTION, SOLUTION INTRAVENOUS PRN
Status: CANCELLED | OUTPATIENT
Start: 2024-05-01

## 2024-05-01 NOTE — H&P (VIEW-ONLY)
[facility]        [address], [place] [address]  Dept: [phone]      Follow up Note      Oliver Walton     Date of Visit:  5/1/2024    CC:  Patient presents for follow up   Chief Complaint   Patient presents with    Follow-up     Lower back       HPI:    Chronic low back pain.     Pain predominantly axial in nature. Denies LE T/ N/ weakness.     He is an avid runner and has run multiple marathons/ Half Marathons. He is still very active and runs regularly.    Pain causes functional limitations/ limits Adl's : Yes     Nursing notes and details of the pain history reviewed. Please see intake notes for details.    He also complains of knee pain     Previous treatments:   Physical Therapy : yes,  continues HEP      Medications: - NSAID's : yes                        - Membrane stabilizers : no                       - Opioids : no                       - Adjuvants or Others : yes, oral steroids     Spine Surgeries: no     He has not been on anticoagulation medications      H/O Smoking: no  H/O Illicit drug use : denies     Employment: employed: Addiction counselor/therapist     Imaging:     MRI of Spine: 4/17/2024:  FINDINGS:  BONES/ALIGNMENT: There is no evidence of fracture or joint dislocation.  T1  and T2 hyperintense hemangioma is noted in the L1 and L4 vertebral bodies.  No marrow at edema or infiltrative process.     SPINAL CORD: The conus terminates normally.     SOFT TISSUES: No paraspinal mass identified.     L1-L2: Severe loss of disc height with a disc osteophyte complex.  No central  canal stenosis.  Mild left lateral recess stenosis.  Moderate left neural  foraminal stenosis.     L2-L3: Moderate loss of disc height with a small disc osteophyte complex.  Mild facet and ligamentous hypertrophy.  Mild central canal and lateral  recess stenoses.  Mild-to-moderate right and mild left neural foraminal  stenoses.     L3-L4: Moderate loss of disc height with a small disc  continue Regular home exercise program as tolerated - stretching / strengthening.     Treatment plan discussed with the patient including medication and procedure side effects.     Controlled Substances Monitoring: [place]ARRS reviewed.     [place] MD

## 2024-05-01 NOTE — PROGRESS NOTES
Oliver Walton presents to the [facility] on 5/1/2024. Oliver is complaining of pain low back. The pain is intermittent. The pain is described as aching and stabbing. Pain is rated on his best day at a 2, on his worst day at a 9, and on average at a 7 on the VAS scale. He took his last dose of Motrin .    Any procedures since your last visit: No,     He is not on NSAIDS and  is not on anticoagulation medications   Pacemaker or defibrillator: No       Medication Contract and Consent for Opioid Use Documents Filed        No documents found                       /86   Pulse 59   Temp 97.2 °F (36.2 °C) (Temporal)   Resp 16   Ht 1.829 m (6')   Wt 78.9 kg (174 lb)   SpO2 95%   BMI 23.60 kg/m²      No LMP for male patient.  
Unknown (3/6/2023)    Housing Stability Vital Sign     Unable to Pay for Housing in the Last Year: Not on file     Number of Places Lived in the Last Year: Not on file     Unstable Housing in the Last Year: No       No family history on file.    REVIEW OF SYSTEMS:     Oliver denies fever/chills, chest pain, shortness of breath, new bowel or bladder complaints. All other review of systems was negative.    PHYSICAL EXAMINATION:      /86   Pulse 59   Temp 97.2 °F (36.2 °C) (Temporal)   Resp 16   Ht 1.829 m (6')   Wt 78.9 kg (174 lb)   SpO2 95%   BMI 23.60 kg/m²     General:       General appearance:  Pleasant and well-hydrated, in no distress and A & O x 3  Build:Normal Weight  Function: Rises from seated position easily and Moves about room without difficulty     HEENT:     Head:normocephalic, atraumatic  Pupils:regular, round, equal  Sclera: icterus absent     Lungs:     Breathing:normal breathing pattern      CVS:     RRR     Abdomen:     Shape:non-distended and normal  Tenderness:none  Guarding:none     Cervical spine:     Inspection:normal  Palpation:tenderness paravertebral muscles, tenderness trapezium, left, right and positive.  Range of motion:Normal flexion, extension, rotation bilaterally and is not painful.  Spurling's: negative bilaterally     Thoracic spine:                Spine inspection:normal   Palpation:No tenderness over the midline and paraspinal area, bilaterally  Range of motion:normal in flexion, extension rotation bilateral and is not painful.     Lumbar spine:     Spine inspection: Normal   Palpation: Tenderness paravertebral muscles Yes bilaterally  Range of motion: Decreased, flexion Decreased, Lateral bending, extension and rotation bilaterally reduced is painful.  Lumbar facet loading + B/l  Sacroiliac joint tenderness No bilaterally  RAMILA test: negative bilaterally  Gaenslen's test:negative bilaterally   Piriformis tenderness: negative bilaterally  SLR : negative bilaterally

## 2024-05-08 ENCOUNTER — TELEPHONE (OUTPATIENT)
Dept: PAIN MANAGEMENT | Age: 64
End: 2024-05-08

## 2024-05-08 DIAGNOSIS — M47.817 LUMBOSACRAL SPONDYLOSIS WITHOUT MYELOPATHY: Primary | ICD-10-CM

## 2024-05-08 NOTE — TELEPHONE ENCOUNTER
Call to Oliver A Gallot and message left that procedure was approved for 5/16/2024 and that [facility] should call him a few days before for the pre op call and between 2:00 PM and 4:00 PM  the business day before with the arrival time. Instructed Oliver to hold ibuprofen for 24 hours, naprosyn and Mobic for 4 days and any aspirin containing products or fish oil for 7 days. Instructed to call office back. Advised that if they do not return the call it may result in their procedure being delayed.    Electronically signed by Kris Shen RN on 5/8/2024 at 1:44 PM

## 2024-05-14 RX ORDER — IBUPROFEN 200 MG
200 TABLET ORAL EVERY 6 HOURS PRN
COMMUNITY

## 2024-05-14 NOTE — PROGRESS NOTES
[facility]  INSTRUCTIONS  ...........................................................................................................................................     [x] Parking the day of Surgery is located in the Main Entrance lot.  Upon entering the door, make immediate right into the surgery reception room    [x]  Bring photo ID and insurance card     [x] You may have a light breakfast day of procedure    [x]  Wear loose comfortable clothing    [x]  Please follow instructions for medications as given per Dr's office    [x] You can expect a call the business day prior to procedure to notify you of your arrival time     [x] Please arrange for     []  Other instructions

## 2024-05-16 ENCOUNTER — HOSPITAL ENCOUNTER (OUTPATIENT)
Dept: GENERAL RADIOLOGY | Age: 64
Setting detail: OUTPATIENT SURGERY
Discharge: HOME | End: 2024-05-18
Attending: ANESTHESIOLOGY
Payer: COMMERCIAL

## 2024-05-16 ENCOUNTER — HOSPITAL ENCOUNTER (OUTPATIENT)
Age: 64
Setting detail: OUTPATIENT SURGERY
Discharge: HOME | End: 2024-05-16
Attending: ANESTHESIOLOGY | Admitting: ANESTHESIOLOGY
Payer: COMMERCIAL

## 2024-05-16 VITALS
RESPIRATION RATE: 18 BRPM | DIASTOLIC BLOOD PRESSURE: 82 MMHG | OXYGEN SATURATION: 97 % | SYSTOLIC BLOOD PRESSURE: 140 MMHG | BODY MASS INDEX: 23.3 KG/M2 | TEMPERATURE: 97.2 F | HEIGHT: 72 IN | WEIGHT: 172 LBS | HEART RATE: 59 BPM

## 2024-05-16 DIAGNOSIS — R52 PAIN MANAGEMENT: ICD-10-CM

## 2024-05-16 PROCEDURE — 2709999900 HC NON-CHARGEABLE SUPPLY: Performed by: ANESTHESIOLOGY

## 2024-05-16 PROCEDURE — 64494 INJ PARAVERT F JNT L/S 2 LEV: CPT | Performed by: ANESTHESIOLOGY

## 2024-05-16 PROCEDURE — 64493 INJ PARAVERT F JNT L/S 1 LEV: CPT | Performed by: ANESTHESIOLOGY

## 2024-05-16 PROCEDURE — 3600000002 HC SURGERY LEVEL 2 BASE: Performed by: ANESTHESIOLOGY

## 2024-05-16 PROCEDURE — 6360000002 HC RX W HCPCS: Performed by: ANESTHESIOLOGY

## 2024-05-16 PROCEDURE — 7100000010 HC PHASE II RECOVERY - FIRST 15 MIN: Performed by: ANESTHESIOLOGY

## 2024-05-16 PROCEDURE — 7100000011 HC PHASE II RECOVERY - ADDTL 15 MIN: Performed by: ANESTHESIOLOGY

## 2024-05-16 PROCEDURE — 2500000003 HC RX 250 WO HCPCS: Performed by: ANESTHESIOLOGY

## 2024-05-16 RX ORDER — SODIUM CHLORIDE 9 MG/ML
INJECTION, SOLUTION INTRAVENOUS PRN
Status: DISCONTINUED | OUTPATIENT
Start: 2024-05-16 | End: 2024-05-16 | Stop reason: HOSPADM

## 2024-05-16 RX ORDER — LIDOCAINE HYDROCHLORIDE 5 MG/ML
INJECTION, SOLUTION INFILTRATION; INTRAVENOUS PRN
Status: DISCONTINUED | OUTPATIENT
Start: 2024-05-16 | End: 2024-05-16 | Stop reason: ALTCHOICE

## 2024-05-16 RX ORDER — METHYLPREDNISOLONE ACETATE 40 MG/ML
INJECTION, SUSPENSION INTRA-ARTICULAR; INTRALESIONAL; INTRAMUSCULAR; SOFT TISSUE PRN
Status: DISCONTINUED | OUTPATIENT
Start: 2024-05-16 | End: 2024-05-16 | Stop reason: ALTCHOICE

## 2024-05-16 RX ORDER — SODIUM CHLORIDE 0.9 % (FLUSH) 0.9 %
5-40 SYRINGE (ML) INJECTION EVERY 12 HOURS SCHEDULED
Status: DISCONTINUED | OUTPATIENT
Start: 2024-05-16 | End: 2024-05-16 | Stop reason: HOSPADM

## 2024-05-16 RX ORDER — BUPIVACAINE HYDROCHLORIDE 5 MG/ML
INJECTION, SOLUTION EPIDURAL; INTRACAUDAL PRN
Status: DISCONTINUED | OUTPATIENT
Start: 2024-05-16 | End: 2024-05-16 | Stop reason: ALTCHOICE

## 2024-05-16 RX ORDER — SODIUM CHLORIDE 0.9 % (FLUSH) 0.9 %
5-40 SYRINGE (ML) INJECTION PRN
Status: DISCONTINUED | OUTPATIENT
Start: 2024-05-16 | End: 2024-05-16 | Stop reason: HOSPADM

## 2024-05-16 ASSESSMENT — PAIN - FUNCTIONAL ASSESSMENT: PAIN_FUNCTIONAL_ASSESSMENT: 0-10

## 2024-05-16 ASSESSMENT — PAIN DESCRIPTION - DESCRIPTORS: DESCRIPTORS: DISCOMFORT

## 2024-05-16 NOTE — DISCHARGE INSTRUCTIONS
[facility] Pain Management Department  [place]-[phone]  Dr. Brett Fritz   Post-Pain Block/Radiofrequency  Home Going Instructions    1-Go home, rest for the remainder of the day  2-Please do not lift over 20 pounds the day of the injection  3-If you received sedation No: alcohol, driving, operating lawn mowers, plows, tractors or other dangerous equipment until next morning. Do not make important decisions or sign legal documents for 24 hours. You may experience light headedness, dizziness, nausea or sleepiness after sedation. Do not stay alone. A responsible adult must be with you for 24 hours. You could be nauseated from the medications you have received. Your IV site may be sore and bruised.    4-No dietary restrictions     5-Resume all medications the same day, blood thinners to be resumed 24 hours after injection if you were instructed to stop any.    6-Keep the surgical site clean and dry, you may shower the next morning and remove the      dressing.     7- No sitz baths, tub baths or hot tubs/swimming for 24 hours.       8- If you have any pain at the injection site(s), application of an ice pack to the area should be       helpful, 20 minutes on/20 minutes off for next 48 hours.  9- Call [facility] immediately at if you develop.  Fever greater than 100.4 F  Have bleeding or drainage from the puncture site  Have progressive Leg/arm numbness and or weakness  Loss of control of bowel and or bladder (wet/soil yourself)  Severe headache with inability to lift head  10-You may return to work the next day

## 2024-05-16 NOTE — INTERVAL H&P NOTE
Update History & Physical    The patient's History and Physical of May 1, 2024 was reviewed with the patient and I examined the patient. There was no change. The surgical site was confirmed by the patient and me.     Plan: The risks, benefits, expected outcome, and alternative to the recommended procedure have been discussed with the patient. Patient understands and wants to proceed with the procedure.     Electronically signed by North West MD on 5/16/2024

## 2024-05-16 NOTE — OP NOTE
Operative Note      Patient: Oliver Walton  YOB: 1960  MRN: [id number]    Date of Procedure: 2024    Pre-Op Diagnosis Codes:     * Lumbosacral spondylosis without myelopathy [M47.817]    Post-Op Diagnosis: Same       Procedure(s):  1 BILATERAL LUMBAR 3, 4 AND 5 DORSAL RAMI MEDIAL BRANCH NERVE BLOCK UNDER FLUOROSCOPIC GUIDANCE    Surgeon(s):  North West MD    Assistant:   * No surgical staff found *    Anesthesia: Local    Estimated Blood Loss (mL): Minimal    Complications: None    Specimens:   * No specimens in log *    Implants:  * No implants in log *      Drains: * No LDAs found *    Findings:  Infection Present At Time Of Surgery (PATOS) (choose all levels that have infection present):  No infection present  Other Findings: good needle placement    Detailed Description of Procedure:   2024    Patient: Oliver Walton  :  1960  Age:  64 y.o.  Sex:  male     PRE-OPERATIVE DIAGNOSIS: Bilateral Lumbar spondylosis, lumbar facet syndrome.    POST-OPERATIVE DIAGNOSIS: Same.    PROCEDURE:  # 1 Fluoroscopic guided lumbar medial branch blocks Bilateral at Levels: L3, L4, L5     SURGEON: North West MD    ANESTHESIA: Local    ESTIMATED BLOOD LOSS: None.  ______________________________________________________________________  BRIEF HISTORY:  Oliver Walton comes in today for 1 fluoroscopic guided lumbar medial branch blocks  Bilateral  at Levels: L3, L4, L5   The potential complications of this procedure were discussed with him again today. He has elected to undergo the aforementioned procedure.     Oliver’s complete History & Physical examination were reviewed in depth, a copy of which is in the chart.    DESCRIPTION OF PROCEDURE:   After confirming written and informed consent, a time-out was performed and Oliver’s name and date of birth, the procedure to be performed as well as the plan for the location of the needle insertion were confirmed.    The patient was brought

## 2024-05-28 ENCOUNTER — HOSPITAL ENCOUNTER (OUTPATIENT)
Age: 64
Discharge: HOME | End: 2024-05-30
Payer: COMMERCIAL

## 2024-05-28 ENCOUNTER — HOSPITAL ENCOUNTER (OUTPATIENT)
Dept: GENERAL RADIOLOGY | Age: 64
Discharge: HOME | End: 2024-05-30
Payer: COMMERCIAL

## 2024-05-28 DIAGNOSIS — M17.0 OSTEOARTHRITIS OF BOTH KNEES, UNSPECIFIED OSTEOARTHRITIS TYPE: ICD-10-CM

## 2024-05-28 PROCEDURE — 73562 X-RAY EXAM OF KNEE 3: CPT

## 2024-05-31 ENCOUNTER — OFFICE VISIT (OUTPATIENT)
Dept: PAIN MANAGEMENT | Age: 64
End: 2024-05-31
Payer: COMMERCIAL

## 2024-05-31 ENCOUNTER — PREP FOR PROCEDURE (OUTPATIENT)
Dept: PAIN MANAGEMENT | Age: 64
End: 2024-05-31

## 2024-05-31 VITALS
WEIGHT: 172 LBS | BODY MASS INDEX: 23.3 KG/M2 | SYSTOLIC BLOOD PRESSURE: 118 MMHG | HEART RATE: 57 BPM | HEIGHT: 72 IN | RESPIRATION RATE: 18 BRPM | OXYGEN SATURATION: 97 % | TEMPERATURE: 97.3 F | DIASTOLIC BLOOD PRESSURE: 70 MMHG

## 2024-05-31 DIAGNOSIS — M47.817 LUMBOSACRAL SPONDYLOSIS WITHOUT MYELOPATHY: Primary | ICD-10-CM

## 2024-05-31 DIAGNOSIS — M51.36 DDD (DEGENERATIVE DISC DISEASE), LUMBAR: ICD-10-CM

## 2024-05-31 DIAGNOSIS — M17.0 PRIMARY OSTEOARTHRITIS OF BOTH KNEES: ICD-10-CM

## 2024-05-31 DIAGNOSIS — M48.061 SPINAL STENOSIS OF LUMBAR REGION, UNSPECIFIED WHETHER NEUROGENIC CLAUDICATION PRESENT: ICD-10-CM

## 2024-05-31 PROCEDURE — 1123F ACP DISCUSS/DSCN MKR DOCD: CPT | Performed by: ANESTHESIOLOGY

## 2024-05-31 PROCEDURE — 99213 OFFICE O/P EST LOW 20 MIN: CPT | Performed by: ANESTHESIOLOGY

## 2024-05-31 RX ORDER — SODIUM CHLORIDE 0.9 % (FLUSH) 0.9 %
5-40 SYRINGE (ML) INJECTION EVERY 12 HOURS SCHEDULED
Status: CANCELLED | OUTPATIENT
Start: 2024-05-31

## 2024-05-31 RX ORDER — SODIUM CHLORIDE 9 MG/ML
INJECTION, SOLUTION INTRAVENOUS PRN
Status: CANCELLED | OUTPATIENT
Start: 2024-05-31

## 2024-05-31 RX ORDER — SODIUM CHLORIDE 0.9 % (FLUSH) 0.9 %
5-40 SYRINGE (ML) INJECTION PRN
Status: CANCELLED | OUTPATIENT
Start: 2024-05-31

## 2024-05-31 NOTE — PROGRESS NOTES
[facility]        [address], [place] [address]  Dept: [phone]      Follow up Note      Oliver Walton     Date of Visit:  5/31/2024    CC:  Patient presents for follow up   Chief Complaint   Patient presents with    Follow-up     1 BILATERAL LUMBAR 3, 4 AND 5 DORSAL RAMI MEDIAL BRANCH NERVE BLOCK       HPI:    Chronic low back pain.     Pain predominantly axial in nature. Denies LE T/ N/ weakness.     He is an avid runner and has run multiple marathons/ Half Marathons. He is still very active and runs regularly.    Pain causes functional limitations/ limits Adl's : Yes     Nursing notes and details of the pain history reviewed. Please see intake notes for details.    He also complains of knee pain     Previous treatments:   Physical Therapy : yes,  continues HEP      Medications: - NSAID's : yes                        - Membrane stabilizers : no                       - Opioids : no                       - Adjuvants or Others : yes, oral steroids     Spine Surgeries: no     He has not been on anticoagulation medications      H/O Smoking: no  H/O Illicit drug use : denies     Employment: employed: Addiction counselor/therapist     Imaging:     Xray of the knees B/l: 5/29/2024:  IMPRESSION:  1. Mild tricompartmental DJD of the left knee.  2. Mild tricompartmental DJD of the right knee.  3. Hardware in the distal right femur with an area of irregularity in the  distal hardware likely from a hardware fracture which is adjacent to area of  likely previous distal screw or anchoring and may be associated.  No adjacent  perihardware luceny or separate bone findings.  Attention on followup on this  site    MRI of Spine: 4/17/2024:  FINDINGS:  BONES/ALIGNMENT: There is no evidence of fracture or joint dislocation.  T1  and T2 hyperintense hemangioma is noted in the L1 and L4 vertebral bodies.  No marrow at edema or infiltrative process.     SPINAL CORD: The conus terminates normally.

## 2024-05-31 NOTE — PROGRESS NOTES
Oliver Walton presents to the [facility] on 5/31/2024. Oliver is complaining of pain in his lower back and b/l knees. The pain is intermittent. The pain is described as aching and stabbing. Pain is rated on his best day at a 4, on his worst day at a 9, and on average at a 4 on the VAS scale. He took his last dose of Motrin yesterday.      Any procedures since your last visit: Yes, with 90 % relief.    He is  on NSAIDS and  is not on anticoagulation medications to include none and is managed by NA.     Pacemaker or defibrillator: No     Medication Contract and Consent for Opioid Use Documents Filed        No documents found                       Resp 18   Ht 1.829 m (6' 0.01\")   Wt 78 kg (172 lb)   BMI 23.32 kg/m²      No LMP for male patient.

## 2024-06-27 ENCOUNTER — TELEPHONE (OUTPATIENT)
Dept: PAIN MANAGEMENT | Age: 64
End: 2024-06-27

## 2024-06-27 NOTE — TELEPHONE ENCOUNTER
Call to Oliver Walton that procedure was scheduled for 7/2/2024 and that [facility] should call him a few days before for the pre op call and after 3:00 PM the business day before with the arrival time. Instructed Oliver to hold ibuprofen for 24 hours, Celebrex, Mobic, and naprosyn for 4 days and any aspirin containing products, CoQ 10, or fish oil for 7 days. Instructed to call office back if any questions. Oliver verbalized understanding.    Electronically signed by Sekou Menard RN on 6/27/2024 at 11:42 AM

## 2024-07-02 ENCOUNTER — HOSPITAL ENCOUNTER (OUTPATIENT)
Dept: OPERATING ROOM | Age: 64
Setting detail: OUTPATIENT SURGERY
Discharge: HOME | End: 2024-07-02
Attending: ANESTHESIOLOGY
Payer: COMMERCIAL

## 2024-07-02 ENCOUNTER — HOSPITAL ENCOUNTER (OUTPATIENT)
Age: 64
Setting detail: OUTPATIENT SURGERY
Discharge: HOME | End: 2024-07-02
Attending: ANESTHESIOLOGY | Admitting: ANESTHESIOLOGY
Payer: COMMERCIAL

## 2024-07-02 VITALS
HEART RATE: 53 BPM | TEMPERATURE: 98 F | SYSTOLIC BLOOD PRESSURE: 123 MMHG | HEIGHT: 72 IN | OXYGEN SATURATION: 98 % | BODY MASS INDEX: 23.3 KG/M2 | RESPIRATION RATE: 16 BRPM | WEIGHT: 172 LBS | DIASTOLIC BLOOD PRESSURE: 78 MMHG

## 2024-07-02 DIAGNOSIS — M47.896 OTHER OSTEOARTHRITIS OF SPINE, LUMBAR REGION: ICD-10-CM

## 2024-07-02 PROCEDURE — 7100000011 HC PHASE II RECOVERY - ADDTL 15 MIN: Performed by: ANESTHESIOLOGY

## 2024-07-02 PROCEDURE — 2500000003 HC RX 250 WO HCPCS: Performed by: ANESTHESIOLOGY

## 2024-07-02 PROCEDURE — 3600000005 HC SURGERY LEVEL 5 BASE: Performed by: ANESTHESIOLOGY

## 2024-07-02 PROCEDURE — 6360000002 HC RX W HCPCS: Performed by: ANESTHESIOLOGY

## 2024-07-02 PROCEDURE — 64493 INJ PARAVERT F JNT L/S 1 LEV: CPT | Performed by: ANESTHESIOLOGY

## 2024-07-02 PROCEDURE — 7100000010 HC PHASE II RECOVERY - FIRST 15 MIN: Performed by: ANESTHESIOLOGY

## 2024-07-02 PROCEDURE — 64494 INJ PARAVERT F JNT L/S 2 LEV: CPT | Performed by: ANESTHESIOLOGY

## 2024-07-02 PROCEDURE — 2709999900 HC NON-CHARGEABLE SUPPLY: Performed by: ANESTHESIOLOGY

## 2024-07-02 RX ORDER — SODIUM CHLORIDE 9 MG/ML
INJECTION, SOLUTION INTRAVENOUS PRN
Status: DISCONTINUED | OUTPATIENT
Start: 2024-07-02 | End: 2024-07-02 | Stop reason: HOSPADM

## 2024-07-02 RX ORDER — LIDOCAINE HYDROCHLORIDE 5 MG/ML
INJECTION, SOLUTION INFILTRATION; INTRAVENOUS PRN
Status: DISCONTINUED | OUTPATIENT
Start: 2024-07-02 | End: 2024-07-02 | Stop reason: ALTCHOICE

## 2024-07-02 RX ORDER — BUPIVACAINE HYDROCHLORIDE 5 MG/ML
INJECTION, SOLUTION PERINEURAL PRN
Status: DISCONTINUED | OUTPATIENT
Start: 2024-07-02 | End: 2024-07-02 | Stop reason: ALTCHOICE

## 2024-07-02 RX ORDER — METHYLPREDNISOLONE ACETATE 40 MG/ML
INJECTION, SUSPENSION INTRA-ARTICULAR; INTRALESIONAL; INTRAMUSCULAR; SOFT TISSUE PRN
Status: DISCONTINUED | OUTPATIENT
Start: 2024-07-02 | End: 2024-07-02 | Stop reason: ALTCHOICE

## 2024-07-02 RX ORDER — SODIUM CHLORIDE 0.9 % (FLUSH) 0.9 %
5-40 SYRINGE (ML) INJECTION PRN
Status: DISCONTINUED | OUTPATIENT
Start: 2024-07-02 | End: 2024-07-02 | Stop reason: HOSPADM

## 2024-07-02 RX ORDER — SODIUM CHLORIDE 0.9 % (FLUSH) 0.9 %
5-40 SYRINGE (ML) INJECTION EVERY 12 HOURS SCHEDULED
Status: DISCONTINUED | OUTPATIENT
Start: 2024-07-02 | End: 2024-07-02 | Stop reason: HOSPADM

## 2024-07-02 ASSESSMENT — PAIN - FUNCTIONAL ASSESSMENT
PAIN_FUNCTIONAL_ASSESSMENT: 0-10

## 2024-07-02 ASSESSMENT — PAIN DESCRIPTION - DESCRIPTORS: DESCRIPTORS: ACHING;DISCOMFORT

## 2024-07-02 NOTE — DISCHARGE INSTRUCTIONS
[facility] Department  [phone]   Post-Pain Block/ Radiofrequency Home Going Instructions    1-Go home, rest for the remainder of the day  2-Please do not lift over 20 pounds the day of the injection  3-If you received sedation No: alcohol, driving, operating lawn mowers, plows, tractors or other dangerous equipment until next morning. Do not make important decisions or sign legal documents for 24 hours. You may experience light headedness, dizziness, nausea or sleepiness after sedation. Do not stay alone. A responsible adult must be with you for 24 hours. You could be nauseated from the medications you have received. Your IV site may be sore and bruised.    4-No dietary restrictions     5-Resume all medications the same day, blood thinners to be resumed 24 hours after injection    6-Keep the surgical site clean and dry, you may shower the next morning and remove the      dressing.     7- No sitz baths, tub baths or hot tubs/swimming for 24 hours.       8- If you have any pain at the injection site(s), application of an ice pack to the area should be       helpful, 20 minutes on/20 minutes off for next 48 hours.  9- Call [facility] pain management immediately at if you develop.  Fever greater than 100.4 F  Have bleeding or drainage from the puncture site  Have progressive Leg/arm numbness and or weakness  Loss of control of bowel and or bladder (wet/soil yourself)  Severe headache with inability to lift head  10-You may return to work the next day     Infection After Surgery: Care Instructions  Overview  After surgery, an infection is always possible. It doesn't mean that the surgery didn't go well.  Because an infection can be serious, your doctor has taken steps to manage it.  Your doctor checked the infection and cleaned it if necessary. Your doctor may have made an opening in the area so that the pus can drain out. You may have gauze in the cut so that the area will stay open and keep draining.

## 2024-07-02 NOTE — OP NOTE
Operative Note      Patient: Oliver Walton  YOB: 1960  MRN: [id number]    Date of Procedure: 2024    Pre-Op Diagnosis Codes:     * Lumbar spondylosis [M47.816]    Post-Op Diagnosis: Same       Procedure(s):  # 2 BILATERAL LUMBAR 3, 4 & 5 DORSAL RAMI MEDIAL BRANCH NERVE BLOCK UNDER FLUOROSCOPIC GUIDANCE    Surgeon(s):  North West MD    Assistant:   * No surgical staff found *    Anesthesia: Local    Estimated Blood Loss (mL): Minimal    Complications: None    Specimens:   * No specimens in log *    Implants:  * No implants in log *      Drains: * No LDAs found *    Findings:  Infection Present At Time Of Surgery (PATOS) (choose all levels that have infection present):  No infection present  Other Findings: good needle placement    Detailed Description of Procedure:   2024    Patient: Oliver Walton  :  1960  Age:  64 y.o.  Sex:  male     PRE-OPERATIVE DIAGNOSIS: Bilateral Lumbar spondylosis, lumbar facet syndrome.    POST-OPERATIVE DIAGNOSIS: Same.    PROCEDURE:  # 2 Fluoroscopic guided lumbar medial branch blocks Bilateral at Levels: L3, L4, L5     SURGEON: North West MD    ANESTHESIA: Local    ESTIMATED BLOOD LOSS: None.  ______________________________________________________________________  BRIEF HISTORY:  Oliver Walton comes in today for 2 fluoroscopic guided lumbar medial branch blocks  Bilateral  at Levels: L3, L4, L5   The potential complications of this procedure were discussed with him again today. He has elected to undergo the aforementioned procedure.     Oliver’s complete History & Physical examination were reviewed in depth, a copy of which is in the chart.    DESCRIPTION OF PROCEDURE:   After confirming written and informed consent, a time-out was performed and Oliver’s name and date of birth, the procedure to be performed as well as the plan for the location of the needle insertion were confirmed.    The patient was brought into the procedure room and

## 2024-07-02 NOTE — PROGRESS NOTES
Pt instructed to have a  for todays appointment, told pre op nurse that michael his girlfriend was 1/2 hour away and would arrive between 1-1:30 for . Pt witnessed leaving facility and getting into car to drive self.

## 2024-07-02 NOTE — H&P
[facility]  [address], [place] [address]  [phone]    Procedure History & Physical      Oliver Walton     HPI:    Patient  is here for Lumbar MBNB  for low back pain.  Labs/imaging studies reviewed   All question and concerns addressed including R/B/A associated with the procedure    Past Medical History:   Diagnosis Date    Hyperlipidemia     Lumbar pain     Thyroid disease        Past Surgical History:   Procedure Laterality Date    FEMUR SURGERY Right     Julio C in place, pins removed    HERNIA REPAIR Right 03/20/2024    LAPAROSCOPIC  BILATERAL INGUINAL HERNIA REPAIR WITH MESH performed by Miky Lynch MD at [facility] OR    NERVE BLOCK Bilateral 5/16/2024    1 BILATERAL LUMBAR 3, 4 AND 5 DORSAL RAMI MEDIAL BRANCH NERVE BLOCK UNDER FLUOROSCOPIC GUIDANCE performed by North West MD at [facility] OR    TOE AMPUTATION      work accident       Prior to Admission medications    Medication Sig Start Date End Date Taking? Authorizing Provider   ibuprofen (ADVIL;MOTRIN) 200 MG tablet Take 1 tablet by mouth every 6 hours as needed for Pain    Provider, MD Pepper   pravastatin (PRAVACHOL) 40 MG tablet Take 1 tablet by mouth daily 2/8/24   Abhi Borjas DO   levothyroxine (SYNTHROID) 200 MCG tablet Take 1 tablet by mouth Daily 2/8/24   Abhi Borjas, DO       No Known Allergies    Social History     Socioeconomic History    Marital status: Single     Spouse name: Not on file    Number of children: Not on file    Years of education: Not on file    Highest education level: Not on file   Occupational History    Not on file   Tobacco Use    Smoking status: Never    Smokeless tobacco: Never   Vaping Use    Vaping Use: Never used   Substance and Sexual Activity    Alcohol use: No    Drug use: No    Sexual activity: Not on file   Other Topics Concern    Not on file   Social History Narrative    Not on file     Social Determinants of Health     Financial Resource

## 2024-07-09 ENCOUNTER — PREP FOR PROCEDURE (OUTPATIENT)
Dept: PAIN MANAGEMENT | Age: 64
End: 2024-07-09

## 2024-07-09 ENCOUNTER — OFFICE VISIT (OUTPATIENT)
Dept: PAIN MANAGEMENT | Age: 64
End: 2024-07-09
Payer: COMMERCIAL

## 2024-07-09 VITALS
WEIGHT: 172 LBS | HEART RATE: 62 BPM | HEIGHT: 72 IN | DIASTOLIC BLOOD PRESSURE: 62 MMHG | BODY MASS INDEX: 23.3 KG/M2 | RESPIRATION RATE: 18 BRPM | OXYGEN SATURATION: 98 % | TEMPERATURE: 96.9 F | SYSTOLIC BLOOD PRESSURE: 120 MMHG

## 2024-07-09 DIAGNOSIS — M47.817 LUMBOSACRAL SPONDYLOSIS WITHOUT MYELOPATHY: Primary | ICD-10-CM

## 2024-07-09 DIAGNOSIS — M47.817 LUMBOSACRAL SPONDYLOSIS WITHOUT MYELOPATHY: ICD-10-CM

## 2024-07-09 DIAGNOSIS — M17.0 BILATERAL PRIMARY OSTEOARTHRITIS OF KNEE: ICD-10-CM

## 2024-07-09 DIAGNOSIS — M51.36 DDD (DEGENERATIVE DISC DISEASE), LUMBAR: ICD-10-CM

## 2024-07-09 PROCEDURE — 99213 OFFICE O/P EST LOW 20 MIN: CPT | Performed by: ANESTHESIOLOGY

## 2024-07-09 PROCEDURE — 20610 DRAIN/INJ JOINT/BURSA W/O US: CPT | Performed by: ANESTHESIOLOGY

## 2024-07-09 RX ORDER — BUPIVACAINE HYDROCHLORIDE 2.5 MG/ML
8 INJECTION, SOLUTION INFILTRATION; PERINEURAL ONCE
Status: COMPLETED | OUTPATIENT
Start: 2024-07-09 | End: 2024-07-09

## 2024-07-09 RX ORDER — SODIUM CHLORIDE 0.9 % (FLUSH) 0.9 %
5-40 SYRINGE (ML) INJECTION EVERY 12 HOURS SCHEDULED
Status: CANCELLED | OUTPATIENT
Start: 2024-07-09

## 2024-07-09 RX ORDER — METHYLPREDNISOLONE ACETATE 40 MG/ML
30 INJECTION, SUSPENSION INTRA-ARTICULAR; INTRALESIONAL; INTRAMUSCULAR; SOFT TISSUE ONCE
Status: COMPLETED | OUTPATIENT
Start: 2024-07-09 | End: 2024-07-09

## 2024-07-09 RX ORDER — SODIUM CHLORIDE 0.9 % (FLUSH) 0.9 %
5-40 SYRINGE (ML) INJECTION PRN
Status: CANCELLED | OUTPATIENT
Start: 2024-07-09

## 2024-07-09 RX ORDER — SODIUM CHLORIDE 9 MG/ML
INJECTION, SOLUTION INTRAVENOUS PRN
Status: CANCELLED | OUTPATIENT
Start: 2024-07-09

## 2024-07-09 RX ADMIN — METHYLPREDNISOLONE ACETATE 30 MG: 40 INJECTION, SUSPENSION INTRA-ARTICULAR; INTRALESIONAL; INTRAMUSCULAR; INTRASYNOVIAL; SOFT TISSUE at 16:17

## 2024-07-09 RX ADMIN — BUPIVACAINE HYDROCHLORIDE 20 MG: 2.5 INJECTION, SOLUTION INFILTRATION; PERINEURAL at 16:17

## 2024-07-09 RX ADMIN — METHYLPREDNISOLONE ACETATE 30 MG: 40 INJECTION, SUSPENSION INTRA-ARTICULAR; INTRALESIONAL; INTRAMUSCULAR; INTRASYNOVIAL; SOFT TISSUE at 16:18

## 2024-07-09 NOTE — PROGRESS NOTES
[facility]        [address], [place] [address]  Dept: [phone]      Follow up Note      Oliver Walton     Date of Visit:  7/9/2024    CC:  Patient presents for follow up   Chief Complaint   Patient presents with    Follow-up     # 2 BILATERAL LUMBAR 3, 4 & 5 DORSAL RAMI MEDIAL BRANCH NERVE BLOCK    Back Pain     And knee pain       HPI:  Chronic low back pain.     Pain predominantly axial in nature. Denies LE T/ N/ weakness.     He is an avid runner and has run multiple marathons/ Half Marathons. He is still very active and runs regularly.    Pain causes functional limitations/ limits Adl's : Yes     Nursing notes and details of the pain history reviewed. Please see intake notes for details.    He also complains of knee pain.      Previous treatments:   Physical Therapy : yes,  continues HEP      Medications: - NSAID's : yes                        - Membrane stabilizers : no                       - Opioids : no                       - Adjuvants or Others : yes, oral steroids     Spine Surgeries: no     He has not been on anticoagulation medications      H/O Smoking: no  H/O Illicit drug use : denies     Employment: employed: Addiction counselor/therapist     Imaging:     Xray of the knees B/l: 5/29/2024:  IMPRESSION:  1. Mild tricompartmental DJD of the left knee.  2. Mild tricompartmental DJD of the right knee.  3. Hardware in the distal right femur with an area of irregularity in the  distal hardware likely from a hardware fracture which is adjacent to area of  likely previous distal screw or anchoring and may be associated.  No adjacent  perihardware luceny or separate bone findings.  Attention on followup on this  site    MRI of Spine: 4/17/2024:  FINDINGS:  BONES/ALIGNMENT: There is no evidence of fracture or joint dislocation.  T1  and T2 hyperintense hemangioma is noted in the L1 and L4 vertebral bodies.  No marrow at edema or infiltrative process.     SPINAL CORD: The

## 2024-07-09 NOTE — PROGRESS NOTES
Oliver Walton presents to the [facility] on 7/9/2024. Oliver is complaining of pain in his lower back and b/l knees. The pain is intermittent. The pain is described as aching and stabbing. Pain is rated on his best day at a 2, on his worst day at a 8, and on average at a 4 on the VAS scale. He took his last dose of Motrin yesterday.      Any procedures since your last visit: Yes, with 95 % relief.    He is  on NSAIDS and  is not on anticoagulation medications to include none and is managed by NA.     Pacemaker or defibrillator: No     Medication Contract and Consent for Opioid Use Documents Filed        No documents found                       Resp 18   Ht 1.829 m (6' 0.01\")   Wt 78 kg (172 lb)   BMI 23.32 kg/m²      No LMP for male patient.

## 2024-07-09 NOTE — H&P (VIEW-ONLY)
[facility]        [address], [place] [address]  Dept: [phone]      Follow up Note      Oliver Walton     Date of Visit:  7/9/2024    CC:  Patient presents for follow up   Chief Complaint   Patient presents with    Follow-up     # 2 BILATERAL LUMBAR 3, 4 & 5 DORSAL RAMI MEDIAL BRANCH NERVE BLOCK    Back Pain     And knee pain       HPI:  Chronic low back pain.     Pain predominantly axial in nature. Denies LE T/ N/ weakness.     He is an avid runner and has run multiple marathons/ Half Marathons. He is still very active and runs regularly.    Pain causes functional limitations/ limits Adl's : Yes     Nursing notes and details of the pain history reviewed. Please see intake notes for details.    He also complains of knee pain.      Previous treatments:   Physical Therapy : yes,  continues HEP      Medications: - NSAID's : yes                        - Membrane stabilizers : no                       - Opioids : no                       - Adjuvants or Others : yes, oral steroids     Spine Surgeries: no     He has not been on anticoagulation medications      H/O Smoking: no  H/O Illicit drug use : denies     Employment: employed: Addiction counselor/therapist     Imaging:     Xray of the knees B/l: 5/29/2024:  IMPRESSION:  1. Mild tricompartmental DJD of the left knee.  2. Mild tricompartmental DJD of the right knee.  3. Hardware in the distal right femur with an area of irregularity in the  distal hardware likely from a hardware fracture which is adjacent to area of  likely previous distal screw or anchoring and may be associated.  No adjacent  perihardware luceny or separate bone findings.  Attention on followup on this  site    MRI of Spine: 4/17/2024:  FINDINGS:  BONES/ALIGNMENT: There is no evidence of fracture or joint dislocation.  T1  and T2 hyperintense hemangioma is noted in the L1 and L4 vertebral bodies.  No marrow at edema or infiltrative process.     SPINAL CORD: The  flexion, extension rotation bilateral and is not painful.     Lumbar spine:     Spine inspection: Normal   Palpation: Tenderness paravertebral muscles Yes bilaterally  Range of motion: Decreased, flexion Decreased, Lateral bending, extension and rotation bilaterally reduced is painful.  Lumbar facet loading + B/l  Sacroiliac joint tenderness No bilaterally  RAMILA test: negative bilaterally  Gaenslen's test:negative bilaterally   Piriformis tenderness: negative bilaterally  SLR : negative bilaterally     Musculoskeletal:     Trigger points- no     Extremities:     Tremors:None bilaterally upper and lower  Edema:no     Knee: joint line tenderness +     Neurological:     Sensory: Normal to light touch      Motor:   Right  5/5              Left  5/5               Right Bicep 5/5           Left Bicep 5/5              Right Triceps 5/5       Left Triceps 5/5          Right Deltoid 5/5     Left Deltoid 5/5                  Right Quadriceps 5/5          Left Quadriceps 5/5           Right Gastrocnemius 5/5    Left Gastrocnemius 5/5  Right Ant Tibialis 5/5  Left Ant Tibialis 5/5     Dermatology:     Skin:no rashes or lesions noted     Assessment/Plan:      Diagnosis Orders   1. Lumbosacral spondylosis without myelopathy        2. DDD (degenerative disc disease), lumbar        3. Bilateral primary osteoarthritis of knee            64 y.o.  male with H/o chronic low back pain.     Pain predominantly axial in nature.     Feature fo facet pain /SS.     He is an avid long distance runner- and runs regularly.     Failed conservative treatment     Xray LS spine reviewed personally and discusses.     Xray Hip reviewed: Right femoral nail is fractured in the proximal portion - recommend ortho eval for this.    Recent MRI of LS spine reviewed personally and discussed.    He also has knee pain. Recent Xray of the knees reviewed and discussed.    S/P X 2 lumbar MBNB > 90% relief for short duration. Has noted recurrence of

## 2024-07-29 ENCOUNTER — TELEPHONE (OUTPATIENT)
Dept: PAIN MANAGEMENT | Age: 64
End: 2024-07-29

## 2024-07-29 NOTE — TELEPHONE ENCOUNTER
Call to Oliver Walton left message that procedure was scheduled for 8/1/2024 and that [facility] should call him a few days before for the pre op call and between 2:00 PM and 4:00 PM  the business day before with the arrival time. Instructed Oliver to hold ibuprofen for 24 hours, Celebrex, Mobic, and naprosyn for 4 days and any aspirin containing products, CoQ 10, or fish oil for 7 days. Instructed to call office back if any questions.    Electronically signed by Sekou Menard RN on 7/29/2024 at 9:57 AM

## 2024-07-31 NOTE — PROGRESS NOTES
Unable to get ahold of patient for PAT telephone assessment. Left VM for Sekou at Dr West's office.

## 2024-07-31 NOTE — PROGRESS NOTES
[facility] PRE-ADMISSION TESTING INSTRUCTIONS    The Preadmission Testing patient is instructed accordingly using the following criteria (check applicable):    ARRIVAL INSTRUCTIONS:  [x] Parking the day of Surgery is located in the Main Entrance lot.  Upon entering the door, make an immediate right to the surgery reception desk    [x] Bring photo ID and insurance card    [] Bring in a copy of Living will or Durable Power of  papers.    [x] Please be sure to arrange for a responsible adult to provide transportation to and from the hospital    [x] Please arrange for a responsible adult to be with you for the 24 hour period post procedure due to having anesthesia    [x] If you awake am of surgery not feeling well or have temperature >100 please call [phone]    GENERAL INSTRUCTIONS:    [x] No solid foods after midnight, may have up to 8oz of water until 4 hours prior to surgery. No gum, no candy, no mints. NPO time:       [x] You may brush your teeth, do not swallow any toothpaste    [x] Take medications as instructed     [x] Stop herbal supplements and vitamins 5 days prior to procedure    [x] Follow preop dosing of blood thinners per physician instructions    [] Take 1/2 dose of evening insulin, but no insulin after midnight    [] No oral diabetic medications after midnight    [] If diabetic and have low blood sugar or feel symptomatic, take 1-2oz apple juice only    [] Bring inhalers day of surgery    [] Bring urine specimen day of surgery    [x] Shower or bath with soap, lather and rinse well, AM of Surgery, no lotion, powders or creams to surgical site    [] Follow bowel prep as instructed per surgeon    [x] No tobacco products within 24 hours of surgery     [x] No alcohol or illegal drug use, marijuana included, within 24 hours of surgery.    [x] Jewelry, body piercing's, eyeglasses, contact lenses and dentures are not permitted into surgery (bring cases)      [x] Please do

## 2024-08-01 ENCOUNTER — ANESTHESIA (OUTPATIENT)
Dept: OPERATING ROOM | Age: 64
End: 2024-08-01
Payer: COMMERCIAL

## 2024-08-01 ENCOUNTER — HOSPITAL ENCOUNTER (OUTPATIENT)
Dept: GENERAL RADIOLOGY | Age: 64
Setting detail: OUTPATIENT SURGERY
Discharge: HOME | End: 2024-08-03
Attending: ANESTHESIOLOGY
Payer: COMMERCIAL

## 2024-08-01 ENCOUNTER — ANESTHESIA EVENT (OUTPATIENT)
Dept: OPERATING ROOM | Age: 64
End: 2024-08-01
Payer: COMMERCIAL

## 2024-08-01 ENCOUNTER — HOSPITAL ENCOUNTER (OUTPATIENT)
Age: 64
Setting detail: OUTPATIENT SURGERY
Discharge: HOME | End: 2024-08-01
Attending: ANESTHESIOLOGY | Admitting: ANESTHESIOLOGY
Payer: COMMERCIAL

## 2024-08-01 VITALS
HEIGHT: 72 IN | TEMPERATURE: 97 F | HEART RATE: 48 BPM | DIASTOLIC BLOOD PRESSURE: 85 MMHG | OXYGEN SATURATION: 97 % | RESPIRATION RATE: 16 BRPM | BODY MASS INDEX: 22.35 KG/M2 | WEIGHT: 165 LBS | SYSTOLIC BLOOD PRESSURE: 138 MMHG

## 2024-08-01 DIAGNOSIS — R52 PAIN MANAGEMENT: ICD-10-CM

## 2024-08-01 PROCEDURE — 3700000001 HC ADD 15 MINUTES (ANESTHESIA): Performed by: ANESTHESIOLOGY

## 2024-08-01 PROCEDURE — 6360000002 HC RX W HCPCS

## 2024-08-01 PROCEDURE — 6360000002 HC RX W HCPCS: Performed by: ANESTHESIOLOGY

## 2024-08-01 PROCEDURE — 7100000011 HC PHASE II RECOVERY - ADDTL 15 MIN: Performed by: ANESTHESIOLOGY

## 2024-08-01 PROCEDURE — 2580000003 HC RX 258

## 2024-08-01 PROCEDURE — 2709999900 HC NON-CHARGEABLE SUPPLY: Performed by: ANESTHESIOLOGY

## 2024-08-01 PROCEDURE — 64636 DESTROY L/S FACET JNT ADDL: CPT | Performed by: ANESTHESIOLOGY

## 2024-08-01 PROCEDURE — 2500000003 HC RX 250 WO HCPCS: Performed by: ANESTHESIOLOGY

## 2024-08-01 PROCEDURE — 3700000000 HC ANESTHESIA ATTENDED CARE: Performed by: ANESTHESIOLOGY

## 2024-08-01 PROCEDURE — 64635 DESTROY LUMB/SAC FACET JNT: CPT | Performed by: ANESTHESIOLOGY

## 2024-08-01 PROCEDURE — 7100000010 HC PHASE II RECOVERY - FIRST 15 MIN: Performed by: ANESTHESIOLOGY

## 2024-08-01 PROCEDURE — 3600000012 HC SURGERY LEVEL 2 ADDTL 15MIN: Performed by: ANESTHESIOLOGY

## 2024-08-01 PROCEDURE — 3600000002 HC SURGERY LEVEL 2 BASE: Performed by: ANESTHESIOLOGY

## 2024-08-01 RX ORDER — SODIUM CHLORIDE 9 MG/ML
INJECTION, SOLUTION INTRAVENOUS PRN
Status: DISCONTINUED | OUTPATIENT
Start: 2024-08-01 | End: 2024-08-01 | Stop reason: HOSPADM

## 2024-08-01 RX ORDER — LIDOCAINE HYDROCHLORIDE 5 MG/ML
INJECTION, SOLUTION INFILTRATION; INTRAVENOUS PRN
Status: DISCONTINUED | OUTPATIENT
Start: 2024-08-01 | End: 2024-08-01 | Stop reason: ALTCHOICE

## 2024-08-01 RX ORDER — METHYLPREDNISOLONE ACETATE 40 MG/ML
INJECTION, SUSPENSION INTRA-ARTICULAR; INTRALESIONAL; INTRAMUSCULAR; SOFT TISSUE PRN
Status: DISCONTINUED | OUTPATIENT
Start: 2024-08-01 | End: 2024-08-01 | Stop reason: ALTCHOICE

## 2024-08-01 RX ORDER — MIDAZOLAM HYDROCHLORIDE 1 MG/ML
INJECTION INTRAMUSCULAR; INTRAVENOUS PRN
Status: DISCONTINUED | OUTPATIENT
Start: 2024-08-01 | End: 2024-08-01 | Stop reason: SDUPTHER

## 2024-08-01 RX ORDER — LIDOCAINE HYDROCHLORIDE 10 MG/ML
INJECTION, SOLUTION EPIDURAL; INFILTRATION; INTRACAUDAL; PERINEURAL PRN
Status: DISCONTINUED | OUTPATIENT
Start: 2024-08-01 | End: 2024-08-01 | Stop reason: ALTCHOICE

## 2024-08-01 RX ORDER — SODIUM CHLORIDE 0.9 % (FLUSH) 0.9 %
5-40 SYRINGE (ML) INJECTION EVERY 12 HOURS SCHEDULED
Status: DISCONTINUED | OUTPATIENT
Start: 2024-08-01 | End: 2024-08-01 | Stop reason: HOSPADM

## 2024-08-01 RX ORDER — FENTANYL CITRATE 50 UG/ML
INJECTION, SOLUTION INTRAMUSCULAR; INTRAVENOUS PRN
Status: DISCONTINUED | OUTPATIENT
Start: 2024-08-01 | End: 2024-08-01 | Stop reason: SDUPTHER

## 2024-08-01 RX ORDER — SODIUM CHLORIDE 0.9 % (FLUSH) 0.9 %
5-40 SYRINGE (ML) INJECTION PRN
Status: DISCONTINUED | OUTPATIENT
Start: 2024-08-01 | End: 2024-08-01 | Stop reason: HOSPADM

## 2024-08-01 RX ORDER — BUPIVACAINE HYDROCHLORIDE 2.5 MG/ML
INJECTION, SOLUTION EPIDURAL; INFILTRATION; INTRACAUDAL PRN
Status: DISCONTINUED | OUTPATIENT
Start: 2024-08-01 | End: 2024-08-01 | Stop reason: ALTCHOICE

## 2024-08-01 RX ORDER — SODIUM CHLORIDE 9 MG/ML
INJECTION, SOLUTION INTRAVENOUS CONTINUOUS PRN
Status: DISCONTINUED | OUTPATIENT
Start: 2024-08-01 | End: 2024-08-01 | Stop reason: SDUPTHER

## 2024-08-01 RX ADMIN — MIDAZOLAM 1 MG: 1 INJECTION INTRAMUSCULAR; INTRAVENOUS at 12:31

## 2024-08-01 RX ADMIN — FENTANYL CITRATE 50 MCG: 50 INJECTION, SOLUTION INTRAMUSCULAR; INTRAVENOUS at 12:34

## 2024-08-01 RX ADMIN — MIDAZOLAM 1 MG: 1 INJECTION INTRAMUSCULAR; INTRAVENOUS at 12:33

## 2024-08-01 RX ADMIN — FENTANYL CITRATE 50 MCG: 50 INJECTION, SOLUTION INTRAMUSCULAR; INTRAVENOUS at 12:31

## 2024-08-01 RX ADMIN — SODIUM CHLORIDE: 9 INJECTION, SOLUTION INTRAVENOUS at 12:25

## 2024-08-01 ASSESSMENT — PAIN SCALES - GENERAL
PAINLEVEL_OUTOF10: 5
PAINLEVEL_OUTOF10: 3

## 2024-08-01 ASSESSMENT — PAIN DESCRIPTION - LOCATION
LOCATION: BACK
LOCATION: BACK

## 2024-08-01 ASSESSMENT — PAIN DESCRIPTION - FREQUENCY: FREQUENCY: CONTINUOUS

## 2024-08-01 ASSESSMENT — PAIN DESCRIPTION - ORIENTATION
ORIENTATION: LOWER
ORIENTATION: LEFT;RIGHT;LOWER

## 2024-08-01 ASSESSMENT — PAIN DESCRIPTION - DESCRIPTORS
DESCRIPTORS: ACHING;DISCOMFORT
DESCRIPTORS: TIGHTNESS

## 2024-08-01 ASSESSMENT — PAIN DESCRIPTION - PAIN TYPE: TYPE: CHRONIC PAIN

## 2024-08-01 NOTE — INTERVAL H&P NOTE
Update History & Physical    The patient's History and Physical of July 9, 2024 was reviewed with the patient and I examined the patient. There was no change. The surgical site was confirmed by the patient and me.     Plan: The risks, benefits, expected outcome, and alternative to the recommended procedure have been discussed with the patient. Patient understands and wants to proceed with the procedure.     Electronically signed by North West MD on 8/1/2024

## 2024-08-01 NOTE — DISCHARGE INSTRUCTIONS
[facility]  [place] Tbekfb-[phone]  Dr. Carlos Fritz   Post-Pain Block/Radiofrequency  Home Going Instructions    1-Go home, rest for the remainder of the day  2-Please do not lift over 20 pounds the day of the injection  3-If you received sedation No: alcohol, driving, operating lawn mowers, plows, tractors or other dangerous equipment until next morning. Do not make important decisions or sign legal documents for 24 hours. You may experience light headedness, dizziness, nausea or sleepiness after sedation. Do not stay alone. A responsible adult must be with you for 24 hours. You could be nauseated from the medications you have received. Your IV site may be sore and bruised.    4-No dietary restrictions     5-Resume all medications the same day, blood thinners to be resumed 24 hours after injection if you were instructed to stop any.    6-Keep the surgical site clean and dry, you may shower the next morning and remove the      dressing.     7- No sitz baths, tub baths or hot tubs/swimming for 24 hours.       8- If you have any pain at the injection site(s), application of an ice pack to the area should be       helpful, 20 minutes on/20 minutes off for next 48 hours.  9- Call [facility] immediately at if you develop.  Fever greater than 100.4 F  Have bleeding or drainage from the puncture site  Have progressive Leg/arm numbness and or weakness  Loss of control of bowel and or bladder (wet/soil yourself)  Severe headache with inability to lift head  10-You may return to work the next day   
normal (ped)...

## 2024-08-01 NOTE — ANESTHESIA PRE PROCEDURE
Department of Anesthesiology  Preprocedure Note       Name:  Oliver Walton   Age:  64 y.o.  :  1960                                          MRN:  [id number]         Date:  2024      Surgeon: Surgeon(s):  North West MD    Procedure: Procedure(s):  BILATERAL LUMBAR RADIOFREQUENCY ABLATION AT LUMBAR 3, LUMBAR 4 AND LUMBAR 5 DORSAL RAMI UNDER FLUOROSCOPY    Medications prior to admission:   Prior to Admission medications    Medication Sig Start Date End Date Taking? Authorizing Provider   ibuprofen (ADVIL;MOTRIN) 200 MG tablet Take 1 tablet by mouth every 6 hours as needed for Pain  Patient not taking: Reported on 2024    Provider, MD Pepper   pravastatin (PRAVACHOL) 40 MG tablet Take 1 tablet by mouth daily 24   Abhi Borjas DO   levothyroxine (SYNTHROID) 200 MCG tablet Take 1 tablet by mouth Daily 24   Abhi Borjas DO       Current medications:    Current Facility-Administered Medications   Medication Dose Route Frequency Provider Last Rate Last Admin    sodium chloride flush 0.9 % injection 5-40 mL  5-40 mL IntraVENous 2 times per day North West MD        sodium chloride flush 0.9 % injection 5-40 mL  5-40 mL IntraVENous PRN North West MD        0.9 % sodium chloride infusion   IntraVENous PRN North West MD           Allergies:  No Known Allergies    Problem List:    Patient Active Problem List   Diagnosis Code    Chronic bilateral low back pain without sciatica M54.50, G89.29    Hypothyroidism due to Hashimoto's thyroiditis E03.8, E06.3    Right inguinal hernia K40.90    Cervical spondylosis without myelopathy M47.812    DDD (degenerative disc disease), cervical M50.30    DDD (degenerative disc disease), lumbar M51.36    Lumbar spondylosis M47.816    Lumbosacral spondylosis without myelopathy M47.817       Past Medical History:        Diagnosis Date    Hyperlipidemia     Lumbar pain     Thyroid disease        Past Surgical History:

## 2024-08-01 NOTE — OP NOTE
Operative Note      Patient: Oliver Walton  YOB: 1960  MRN: [id number]    Date of Procedure: 2024    Pre-Op Diagnosis Codes:     * Lumbar spondylosis [M47.816]    Post-Op Diagnosis: Same       Procedure(s):  BILATERAL LUMBAR RADIOFREQUENCY ABLATION AT LUMBAR 3, LUMBAR 4 AND LUMBAR 5 DORSAL RAMI UNDER FLUOROSCOPY    Surgeon(s):  North West MD    Assistant:   * No surgical staff found *    Anesthesia: Monitor Anesthesia Care    Estimated Blood Loss (mL): Minimal    Complications: None    Specimens:   * No specimens in log *    Implants:  * No implants in log *      Drains: * No LDAs found *    Findings:  Infection Present At Time Of Surgery (PATOS) (choose all levels that have infection present):  No infection present  Other Findings: good needle placement    Detailed Description of Procedure:   2024    Patient: Oliver Walton  :  1960  Age:  64 y.o.  Sex:  male     PRE-OPERATIVE DIAGNOSIS:  Lumbar spondylosis, lumbar facet arthropathy.    POST-OPERATIVE DIAGNOSIS: Same.    PROCEDURE:  Fluoroscopic-guided Bilateral  Lumbar facet medial branch radiofrequency ablation at levels L3, L4, L5     SURGEON:  North West MD    ANESTHESIA: MAC    ESTIMATED BLOOD LOSS: None.  ______________________________________________________________________  HISTORY & PHYSICAL: Oliver Walton presents today for fluoroscopic-guided Bilateral lumbar facet medial branch radiofrequency ablation at levels L3, L4, L5  The potential complications of the procedure were explained to Oliver again today and he has elected to undergo the aforementioned procedure.    Oliver’s complete History & Physical examination were reviewed in depth, a copy of which is in the chart.      DESCRIPTION OF PROCEDURE:    After confirming written and informed consent, a time-out was performed and Oliver’s name and date of birth, the procedure to be performed as well as the plan for the location of the needle insertion were  confirmed.    The patient was brought into the procedure room and placed in the prone position on the fluoroscopy table. Standard monitors were placed and vital signs were observed throughout the procedure. The area of the lumbar spine and upper buttocks was sterilely prepped with chloraprep and draped in a sterile manner. AP fluoroscopy was used to identify and pelon bartons point at the targeted area. # 20 gauge 10 mm radiofrequency probe was advanced toward each of these points. Once bone was contacted, negative aspiration for blood and CSF was confirmed, sensory stimulation was performed at 50 Hz and at 0.4 volts generating a pressure sensation. Motor stimulation < 2.0 volts elicited multifidus twitching without any radicular symptoms. 1 ml of 1% lidocaine was injected prior to lesioning, which was performed for 90 seconds at 90 degrees centigrade. Once the lesions were complete, a solution of 0.25% marcaine 0.5 cc and 5 mg DepoMedrol was injected through each probe. The probes were removed . The patient's back was cleaned and bandages were placed over the needle insertion sites.    Disposition the patient tolerated the procedure well and there were no complications . Vital signs remained stable throughout the procedure. The patient was escorted to the recovery area where they remained until discharge and written discharge instructions for the procedure were given.    Plan: Pelon will return to our pain management center as scheduled.     North West MD

## 2024-08-26 ENCOUNTER — OFFICE VISIT (OUTPATIENT)
Dept: PAIN MANAGEMENT | Age: 64
End: 2024-08-26
Payer: COMMERCIAL

## 2024-08-26 VITALS
BODY MASS INDEX: 21.94 KG/M2 | OXYGEN SATURATION: 95 % | HEART RATE: 53 BPM | WEIGHT: 162 LBS | RESPIRATION RATE: 16 BRPM | DIASTOLIC BLOOD PRESSURE: 74 MMHG | SYSTOLIC BLOOD PRESSURE: 112 MMHG | HEIGHT: 72 IN

## 2024-08-26 DIAGNOSIS — M47.817 LUMBOSACRAL SPONDYLOSIS WITHOUT MYELOPATHY: Primary | ICD-10-CM

## 2024-08-26 DIAGNOSIS — M79.671 FOOT PAIN, RIGHT: ICD-10-CM

## 2024-08-26 DIAGNOSIS — M51.36 DDD (DEGENERATIVE DISC DISEASE), LUMBAR: ICD-10-CM

## 2024-08-26 PROCEDURE — 1123F ACP DISCUSS/DSCN MKR DOCD: CPT | Performed by: ANESTHESIOLOGY

## 2024-08-26 PROCEDURE — 99213 OFFICE O/P EST LOW 20 MIN: CPT | Performed by: ANESTHESIOLOGY

## 2024-08-26 NOTE — PROGRESS NOTES
Oliver Walton presents to the [facility] on 8/26/2024. Oliver is complaining of pain lower back. The pain is constant. The pain is described as aching. Pain is rated on his best day at a 2, on his worst day at a 9, and on average at a 4 on the VAS scale. He took his last dose of Motrin yesterday.     Any procedures since your last visit: Yes, with 80 % relief.    Pacemaker or defibrillator: No.    He is  on NSAIDS and is not on anticoagulation medications   Medication Contract and Consent for Opioid Use Documents Filed        No documents found                    /74   Pulse 53   Resp 16   Ht 1.829 m (6')   Wt 73.5 kg (162 lb)   SpO2 95%   BMI 21.97 kg/m²      No LMP for male patient.  
  Shape:non-distended and normal     Cervical spine:     Inspection:normal     Thoracic spine:                Spine inspection:normal   Palpation:No tenderness over the midline and paraspinal area, bilaterally  Range of motion:normal in flexion, extension rotation bilateral and is not painful.     Lumbar spine:     Spine inspection: Normal   Palpation: Tenderness paravertebral muscles Yes bilaterally  Range of motion: Decreased, flexion Decreased, Lateral bending, extension and rotation bilaterally reduced is painful.  Lumbar facet loading improved.  Sacroiliac joint tenderness No bilaterally  RAMILA test: negative bilaterally  Gaenslen's test:negative bilaterally   Piriformis tenderness: negative bilaterally  SLR : negative bilaterally     Musculoskeletal:     Trigger points- no     Extremities:     Tremors:None bilaterally upper and lower  Edema:no     Knee: joint line tenderness +     Neurological:     Sensory: Normal to light touch      Motor:   Right  5/5              Left  5/5               Right Bicep 5/5           Left Bicep 5/5              Right Triceps 5/5       Left Triceps 5/5          Right Deltoid 5/5     Left Deltoid 5/5                  Right Quadriceps 5/5          Left Quadriceps 5/5           Right Gastrocnemius 5/5    Left Gastrocnemius 5/5  Right Ant Tibialis 5/5  Left Ant Tibialis 5/5     Dermatology:     Skin:no rashes or lesions noted     Assessment/Plan:      Diagnosis Orders   1. Lumbosacral spondylosis without myelopathy        2. DDD (degenerative disc disease), lumbar        3. Foot pain, right  Edilberto Rangel DPM, Podiatry, [place]        64 y.o.  male with H/o chronic low back pain.     Pain predominantly axial in nature.     Feature of facet pain /SS.     He is an avid long distance runner- and runs regularly.     Failed conservative treatment     Xray LS spine reviewed personally and discusses.     Xray Hip reviewed: Right femoral nail is fractured in the proximal

## 2024-09-12 ENCOUNTER — OFFICE VISIT (OUTPATIENT)
Dept: PODIATRY | Age: 64
End: 2024-09-12
Payer: COMMERCIAL

## 2024-09-12 VITALS — BODY MASS INDEX: 22.24 KG/M2 | TEMPERATURE: 97.7 F | WEIGHT: 164 LBS

## 2024-09-12 DIAGNOSIS — M20.41 HAMMER TOES OF BOTH FEET: ICD-10-CM

## 2024-09-12 DIAGNOSIS — L84 CORN OR CALLUS: Primary | ICD-10-CM

## 2024-09-12 DIAGNOSIS — M79.674 PAIN IN RIGHT TOE(S): ICD-10-CM

## 2024-09-12 DIAGNOSIS — M20.42 HAMMER TOES OF BOTH FEET: ICD-10-CM

## 2024-09-12 PROCEDURE — 99202 OFFICE O/P NEW SF 15 MIN: CPT | Performed by: PODIATRIST

## 2024-09-12 PROCEDURE — 1123F ACP DISCUSS/DSCN MKR DOCD: CPT | Performed by: PODIATRIST

## 2024-10-30 DIAGNOSIS — E06.3 HYPOTHYROIDISM DUE TO HASHIMOTO'S THYROIDITIS: ICD-10-CM

## 2024-10-31 RX ORDER — LEVOTHYROXINE SODIUM 200 UG/1
200 TABLET ORAL DAILY
Qty: 90 TABLET | Refills: 0 | Status: SHIPPED | OUTPATIENT
Start: 2024-10-31

## 2024-11-02 DIAGNOSIS — E78.2 MIXED HYPERLIPIDEMIA: ICD-10-CM

## 2024-11-04 RX ORDER — PRAVASTATIN SODIUM 40 MG
40 TABLET ORAL DAILY
Qty: 90 TABLET | Refills: 0 | Status: SHIPPED | OUTPATIENT
Start: 2024-11-04

## 2024-11-13 ENCOUNTER — OFFICE VISIT (OUTPATIENT)
Dept: PRIMARY CARE CLINIC | Age: 64
End: 2024-11-13

## 2024-11-13 VITALS
SYSTOLIC BLOOD PRESSURE: 118 MMHG | WEIGHT: 169 LBS | TEMPERATURE: 97.9 F | DIASTOLIC BLOOD PRESSURE: 76 MMHG | BODY MASS INDEX: 22.89 KG/M2 | HEART RATE: 67 BPM | OXYGEN SATURATION: 97 % | HEIGHT: 72 IN

## 2024-11-13 DIAGNOSIS — R79.89 OTHER SPECIFIED ABNORMAL FINDINGS OF BLOOD CHEMISTRY: ICD-10-CM

## 2024-11-13 DIAGNOSIS — E06.3 HYPOTHYROIDISM DUE TO HASHIMOTO'S THYROIDITIS: ICD-10-CM

## 2024-11-13 DIAGNOSIS — Z12.5 ENCOUNTER FOR SCREENING FOR MALIGNANT NEOPLASM OF PROSTATE: ICD-10-CM

## 2024-11-13 DIAGNOSIS — E78.2 MIXED HYPERLIPIDEMIA: ICD-10-CM

## 2024-11-13 RX ORDER — PRAVASTATIN SODIUM 40 MG
40 TABLET ORAL DAILY
Qty: 90 TABLET | Refills: 3 | Status: SHIPPED | OUTPATIENT
Start: 2024-11-13

## 2024-11-13 RX ORDER — LEVOTHYROXINE SODIUM 200 UG/1
200 TABLET ORAL DAILY
Qty: 90 TABLET | Refills: 3 | Status: SHIPPED | OUTPATIENT
Start: 2024-11-13

## 2024-11-13 ASSESSMENT — ENCOUNTER SYMPTOMS
VOMITING: 0
COUGH: 0
ABDOMINAL PAIN: 0
CONSTIPATION: 0
NAUSEA: 0
PHOTOPHOBIA: 0
SORE THROAT: 0
DIARRHEA: 0
SHORTNESS OF BREATH: 0
BLOOD IN STOOL: 0
BACK PAIN: 1

## 2024-11-13 NOTE — ASSESSMENT & PLAN NOTE
Orders:    pravastatin (PRAVACHOL) 40 MG tablet; Take 1 tablet by mouth daily    CBC with Auto Differential; Future    T4, Free; Future    Uric Acid; Future    PSA Screening; Future    TSH; Future    Hepatic Function Panel; Future    Basic Metabolic Panel; Future    Lipid Panel; Future    Hemoglobin A1C; Future    Urinalysis with Microscopic; Future    Microalbumin, Ur; Future

## 2024-11-13 NOTE — PROGRESS NOTES
Oliver Walton (:  1960) is a 64 y.o. male,Established patient, here for evaluation of the following chief complaint(s):  Follow-up         Assessment & Plan  Mixed hyperlipidemia       Orders:    pravastatin (PRAVACHOL) 40 MG tablet; Take 1 tablet by mouth daily    CBC with Auto Differential; Future    T4, Free; Future    Uric Acid; Future    PSA Screening; Future    TSH; Future    Hepatic Function Panel; Future    Basic Metabolic Panel; Future    Lipid Panel; Future    Hemoglobin A1C; Future    Urinalysis with Microscopic; Future    Microalbumin, Ur; Future    Hypothyroidism due to Hashimoto's thyroiditis       Orders:    levothyroxine (SYNTHROID) 200 MCG tablet; Take 1 tablet by mouth daily    CBC with Auto Differential; Future    T4, Free; Future    Uric Acid; Future    PSA Screening; Future    TSH; Future    Hepatic Function Panel; Future    Basic Metabolic Panel; Future    Lipid Panel; Future    Hemoglobin A1C; Future    Urinalysis with Microscopic; Future    Microalbumin, Ur; Future    Continue with current medication regimen.  Baseline labs in the next several weeks.  Adjust medications based on results.  See him back in 1 year with annual wellness visit or sooner if needed.    Return in about 1 year (around 2025).       Subjective   HPI  Patient seen for follow-up on chronic issues.  Medication refills as well.  Has been taking all medication as prescribed denies side effects from his reactions.  Only issue today has been some left hip/groin pain which occurred after he was out for a run.  No obvious trauma or injury prior to symptoms beginning.  No radiation to any other body part.      Review of Systems   Constitutional:  Negative for chills and fever.   HENT:  Negative for congestion, hearing loss, nosebleeds and sore throat.    Eyes:  Negative for photophobia.   Respiratory:  Negative for cough and shortness of breath.    Cardiovascular:  Negative for chest pain, palpitations and leg

## 2024-11-13 NOTE — ASSESSMENT & PLAN NOTE
Orders:    levothyroxine (SYNTHROID) 200 MCG tablet; Take 1 tablet by mouth daily    CBC with Auto Differential; Future    T4, Free; Future    Uric Acid; Future    PSA Screening; Future    TSH; Future    Hepatic Function Panel; Future    Basic Metabolic Panel; Future    Lipid Panel; Future    Hemoglobin A1C; Future    Urinalysis with Microscopic; Future    Microalbumin, Ur; Future    Continue with current medication regimen.  Baseline labs in the next several weeks.  Adjust medications based on results.  See him back in 1 year with annual wellness visit or sooner if needed.

## 2024-11-19 ENCOUNTER — PROCEDURE VISIT (OUTPATIENT)
Dept: PODIATRY | Age: 64
End: 2024-11-19
Payer: COMMERCIAL

## 2024-11-19 VITALS
DIASTOLIC BLOOD PRESSURE: 70 MMHG | SYSTOLIC BLOOD PRESSURE: 124 MMHG | WEIGHT: 169 LBS | OXYGEN SATURATION: 96 % | BODY MASS INDEX: 22.92 KG/M2 | TEMPERATURE: 98.2 F | HEART RATE: 60 BPM

## 2024-11-19 DIAGNOSIS — M20.41 ACQUIRED HAMMER TOE OF RIGHT FOOT: ICD-10-CM

## 2024-11-19 DIAGNOSIS — G89.29 CHRONIC BILATERAL LOW BACK PAIN WITHOUT SCIATICA: Primary | ICD-10-CM

## 2024-11-19 DIAGNOSIS — M79.674 PAIN IN RIGHT TOE(S): ICD-10-CM

## 2024-11-19 DIAGNOSIS — M21.961 METATARSAL DEFORMITY, RIGHT: ICD-10-CM

## 2024-11-19 DIAGNOSIS — M54.50 CHRONIC BILATERAL LOW BACK PAIN WITHOUT SCIATICA: Primary | ICD-10-CM

## 2024-11-19 PROCEDURE — 1159F MED LIST DOCD IN RCRD: CPT | Performed by: PODIATRIST

## 2024-11-19 PROCEDURE — 1123F ACP DISCUSS/DSCN MKR DOCD: CPT | Performed by: PODIATRIST

## 2024-11-19 PROCEDURE — 99213 OFFICE O/P EST LOW 20 MIN: CPT | Performed by: PODIATRIST

## 2024-11-19 RX ORDER — PREDNISONE 10 MG/1
TABLET ORAL
Qty: 18 TABLET | Refills: 1 | Status: SHIPPED | OUTPATIENT
Start: 2024-11-19

## 2024-11-19 NOTE — PATIENT INSTRUCTIONS
Surgery will be scheduled for 1/3/25  At [facility] in [address]  [address]  They will contact you to give you all your preop instructions  Do not eat or drink anything after midnight on 1/2/25  nothing morning of surgery  Call Ida if any issues [phone]

## 2024-11-20 ENCOUNTER — TELEPHONE (OUTPATIENT)
Dept: PODIATRY | Age: 64
End: 2024-11-20

## 2024-11-20 PROBLEM — M20.41 ACQUIRED HAMMER TOE OF RIGHT FOOT: Status: ACTIVE | Noted: 2024-11-19

## 2024-11-20 PROBLEM — M21.961 METATARSAL DEFORMITY, RIGHT: Status: ACTIVE | Noted: 2024-11-19

## 2024-11-20 NOTE — TELEPHONE ENCOUNTER
Prior Authorization Form:      DEMOGRAPHICS:                     Patient Name:  Yang Walton  Patient :  1960            Insurance:  Payor: [employer]AN / Plan: DEVOTED HEALTH PLANS / Product Type: *No Product type* /   Insurance ID Number:    Payer/Plan Subscr  Sex Relation Sub. Ins. ID Effective Group Num   1. DEVOTED HEALT* YANG WALTON 1960 Male Self [id number]                                    [id number]         DIAGNOSIS & PROCEDURE:                       Procedure/Operation: arthroplasty fifth digit right foot  resection fifth metatarsal head right foot  lmac   arthrex            CPT Code: 26249 [id number]    Diagnosis:  hammertoe and deformed met head right foot    ICD10 Code:  hammertoe and deformed met head right foot      Location:  right foot    Surgeon:  rigo    SCHEDULING INFORMATION:                          Date: 1/3/25    Time:               Anesthesia: lmac    Status:  Outpatient        Special Comments:         Electronically signed by Ida Hyde LPN on 2024 at 7:11 AM

## 2024-11-20 NOTE — PROGRESS NOTES
risk and benefits all complications including anesthesia infection further surgery over under correction patient agrees to proceed with surgery.  General the terms and procedures of the treatment were undertaken there was alternative procedures and methods discussed with the patient the patient declined these and opted for surgery.  All the risks with the procedure were reviewed.  Patient will have surgery at [facility] in the future we did review x-rays.  I did place him on prednisone for inflammation.  Follow-up     Oliver was seen today for callouses and toe pain.    Diagnoses and all orders for this visit:    Chronic bilateral low back pain without sciatica  -     XR LUMBAR SPINE (2-3 VIEWS); Future  -     XR HIP BILATERAL W AP PELVIS (2 VIEWS); Future    Pain in right toe(s)  -     XR FOOT RIGHT (MIN 3 VIEWS); Future    Acquired hammer toe of right foot    Metatarsal deformity, right    Other orders  -     predniSONE (DELTASONE) 10 MG tablet; 3 tabs  Qd x 3  days   2 tabs qd x 3 days  1 tab qd  X 3 days  -     Cancel: Case Request        Return in about 1 month (around 12/19/2024).      Seen By:  Edilberto Tubbs DPM      Document was created using voice recognition software.  Note was reviewed, however may contain grammatical errors.

## 2024-11-21 DIAGNOSIS — M20.41 HAMMER TOE OF RIGHT FOOT: ICD-10-CM

## 2024-11-21 DIAGNOSIS — M47.816 LUMBAR SPONDYLOSIS: Primary | ICD-10-CM

## 2024-11-21 DIAGNOSIS — M21.961 FOOT DEFORMITY, RIGHT: ICD-10-CM

## 2024-12-07 ENCOUNTER — HOSPITAL ENCOUNTER (OUTPATIENT)
Age: 64
Discharge: HOME | End: 2024-12-07
Payer: COMMERCIAL

## 2024-12-07 DIAGNOSIS — R79.89 OTHER SPECIFIED ABNORMAL FINDINGS OF BLOOD CHEMISTRY: ICD-10-CM

## 2024-12-07 DIAGNOSIS — E06.3 HYPOTHYROIDISM DUE TO HASHIMOTO'S THYROIDITIS: ICD-10-CM

## 2024-12-07 DIAGNOSIS — E78.2 MIXED HYPERLIPIDEMIA: ICD-10-CM

## 2024-12-07 DIAGNOSIS — Z12.5 ENCOUNTER FOR SCREENING FOR MALIGNANT NEOPLASM OF PROSTATE: ICD-10-CM

## 2024-12-07 LAB
ALBUMIN SERPL-MCNC: 4.2 G/DL (ref 3.5–5.2)
ALP SERPL-CCNC: 70 U/L (ref 40–129)
ALT SERPL-CCNC: 45 U/L (ref 0–40)
ANION GAP SERPL CALCULATED.3IONS-SCNC: 7 MMOL/L (ref 7–16)
AST SERPL-CCNC: 29 U/L (ref 0–39)
BASOPHILS # BLD: 0.03 K/UL (ref 0–0.2)
BASOPHILS NFR BLD: 1 % (ref 0–2)
BILIRUB DIRECT SERPL-MCNC: <0.2 MG/DL (ref 0–0.3)
BILIRUB INDIRECT SERPL-MCNC: ABNORMAL MG/DL (ref 0–1)
BILIRUB SERPL-MCNC: 0.5 MG/DL (ref 0–1.2)
BILIRUB UR QL STRIP: NEGATIVE
BUN SERPL-MCNC: 21 MG/DL (ref 6–23)
CALCIUM SERPL-MCNC: 9.3 MG/DL (ref 8.6–10.2)
CHLORIDE SERPL-SCNC: 103 MMOL/L (ref 98–107)
CHOLEST SERPL-MCNC: 208 MG/DL
CLARITY UR: CLEAR
CO2 SERPL-SCNC: 30 MMOL/L (ref 22–29)
COLOR UR: YELLOW
CREAT SERPL-MCNC: 0.9 MG/DL (ref 0.7–1.2)
CREAT UR-MCNC: 110.8 MG/DL (ref 40–278)
EOSINOPHIL # BLD: 0.12 K/UL (ref 0.05–0.5)
EOSINOPHILS RELATIVE PERCENT: 2 % (ref 0–6)
ERYTHROCYTE [DISTWIDTH] IN BLOOD BY AUTOMATED COUNT: 13.3 % (ref 11.5–15)
GFR, ESTIMATED: >90 ML/MIN/1.73M2
GLUCOSE SERPL-MCNC: 108 MG/DL (ref 74–99)
GLUCOSE UR STRIP-MCNC: NEGATIVE MG/DL
HBA1C MFR BLD: 5.7 % (ref 4–5.6)
HCT VFR BLD AUTO: 48.2 % (ref 37–54)
HDLC SERPL-MCNC: 77 MG/DL
HGB BLD-MCNC: 15.9 G/DL (ref 12.5–16.5)
HGB UR QL STRIP.AUTO: NEGATIVE
IMM GRANULOCYTES # BLD AUTO: 0.03 K/UL (ref 0–0.58)
IMM GRANULOCYTES NFR BLD: 1 % (ref 0–5)
KETONES UR STRIP-MCNC: NEGATIVE MG/DL
LDLC SERPL CALC-MCNC: 108 MG/DL
LEUKOCYTE ESTERASE UR QL STRIP: NEGATIVE
LYMPHOCYTES NFR BLD: 1.55 K/UL (ref 1.5–4)
LYMPHOCYTES RELATIVE PERCENT: 32 % (ref 20–42)
MCH RBC QN AUTO: 30.6 PG (ref 26–35)
MCHC RBC AUTO-ENTMCNC: 33 G/DL (ref 32–34.5)
MCV RBC AUTO: 92.7 FL (ref 80–99.9)
MICROALBUMIN UR-MCNC: <12 MG/L (ref 0–19)
MICROALBUMIN/CREAT UR-RTO: NORMAL MCG/MG CREAT (ref 0–30)
MONOCYTES NFR BLD: 0.45 K/UL (ref 0.1–0.95)
MONOCYTES NFR BLD: 9 % (ref 2–12)
NEUTROPHILS NFR BLD: 56 % (ref 43–80)
NEUTS SEG NFR BLD: 2.73 K/UL (ref 1.8–7.3)
NITRITE UR QL STRIP: NEGATIVE
PH UR STRIP: 6 [PH] (ref 5–9)
PLATELET # BLD AUTO: 182 K/UL (ref 130–450)
PMV BLD AUTO: 9.9 FL (ref 7–12)
POTASSIUM SERPL-SCNC: 4.3 MMOL/L (ref 3.5–5)
PROT SERPL-MCNC: 6.6 G/DL (ref 6.4–8.3)
PROT UR STRIP-MCNC: NEGATIVE MG/DL
PSA SERPL-MCNC: 1.12 NG/ML (ref 0–4)
RBC # BLD AUTO: 5.2 M/UL (ref 3.8–5.8)
RBC #/AREA URNS HPF: NORMAL /HPF
SODIUM SERPL-SCNC: 140 MMOL/L (ref 132–146)
SP GR UR STRIP: 1.02 (ref 1–1.03)
T4 FREE SERPL-MCNC: 1.4 NG/DL (ref 0.9–1.7)
TRIGL SERPL-MCNC: 113 MG/DL
TSH SERPL DL<=0.05 MIU/L-ACNC: 0.7 UIU/ML (ref 0.27–4.2)
URATE SERPL-MCNC: 5.7 MG/DL (ref 3.4–7)
UROBILINOGEN UR STRIP-ACNC: 0.2 EU/DL (ref 0–1)
VLDLC SERPL CALC-MCNC: 23 MG/DL
WBC #/AREA URNS HPF: NORMAL /HPF
WBC OTHER # BLD: 4.9 K/UL (ref 4.5–11.5)

## 2024-12-07 PROCEDURE — 85025 COMPLETE CBC W/AUTO DIFF WBC: CPT

## 2024-12-07 PROCEDURE — 80053 COMPREHEN METABOLIC PANEL: CPT

## 2024-12-07 PROCEDURE — G0103 PSA SCREENING: HCPCS

## 2024-12-07 PROCEDURE — 84439 ASSAY OF FREE THYROXINE: CPT

## 2024-12-07 PROCEDURE — 36415 COLL VENOUS BLD VENIPUNCTURE: CPT

## 2024-12-07 PROCEDURE — 82248 BILIRUBIN DIRECT: CPT

## 2024-12-07 PROCEDURE — 83036 HEMOGLOBIN GLYCOSYLATED A1C: CPT

## 2024-12-07 PROCEDURE — 80061 LIPID PANEL: CPT

## 2024-12-07 PROCEDURE — 82043 UR ALBUMIN QUANTITATIVE: CPT

## 2024-12-07 PROCEDURE — 81001 URINALYSIS AUTO W/SCOPE: CPT

## 2024-12-07 PROCEDURE — 84443 ASSAY THYROID STIM HORMONE: CPT

## 2024-12-07 PROCEDURE — 84550 ASSAY OF BLOOD/URIC ACID: CPT

## 2024-12-07 PROCEDURE — 82570 ASSAY OF URINE CREATININE: CPT

## 2024-12-16 ENCOUNTER — OFFICE VISIT (OUTPATIENT)
Dept: PRIMARY CARE CLINIC | Age: 64
End: 2024-12-16
Payer: COMMERCIAL

## 2024-12-16 VITALS
HEIGHT: 72 IN | HEART RATE: 79 BPM | TEMPERATURE: 97.8 F | DIASTOLIC BLOOD PRESSURE: 74 MMHG | SYSTOLIC BLOOD PRESSURE: 128 MMHG | WEIGHT: 177 LBS | OXYGEN SATURATION: 98 % | BODY MASS INDEX: 23.98 KG/M2

## 2024-12-16 DIAGNOSIS — E78.2 MIXED HYPERLIPIDEMIA: ICD-10-CM

## 2024-12-16 DIAGNOSIS — Z01.818 PRE-OPERATIVE EXAMINATION: Primary | ICD-10-CM

## 2024-12-16 DIAGNOSIS — E06.3 HYPOTHYROIDISM DUE TO HASHIMOTO'S THYROIDITIS: ICD-10-CM

## 2024-12-16 PROCEDURE — 1123F ACP DISCUSS/DSCN MKR DOCD: CPT | Performed by: FAMILY MEDICINE

## 2024-12-16 PROCEDURE — 1159F MED LIST DOCD IN RCRD: CPT | Performed by: FAMILY MEDICINE

## 2024-12-16 PROCEDURE — 93000 ELECTROCARDIOGRAM COMPLETE: CPT | Performed by: FAMILY MEDICINE

## 2024-12-16 PROCEDURE — 99212 OFFICE O/P EST SF 10 MIN: CPT | Performed by: FAMILY MEDICINE

## 2024-12-16 RX ORDER — LEVOTHYROXINE SODIUM 200 UG/1
200 TABLET ORAL DAILY
Qty: 90 TABLET | Refills: 3 | Status: SHIPPED | OUTPATIENT
Start: 2024-12-16

## 2024-12-16 RX ORDER — PRAVASTATIN SODIUM 40 MG
40 TABLET ORAL DAILY
Qty: 90 TABLET | Refills: 3 | Status: SHIPPED | OUTPATIENT
Start: 2024-12-16

## 2024-12-16 ASSESSMENT — ENCOUNTER SYMPTOMS
SHORTNESS OF BREATH: 0
NAUSEA: 0
PHOTOPHOBIA: 0
COUGH: 0
BACK PAIN: 1
DIARRHEA: 0
CONSTIPATION: 0
BLOOD IN STOOL: 0
ABDOMINAL PAIN: 0
VOMITING: 0
SORE THROAT: 0

## 2024-12-16 NOTE — PROGRESS NOTES
Oliver Walton (:  1960) is a 64 y.o. male,Established patient, here for evaluation of the following chief complaint(s):  Pre-op Exam (25 dr. Tubbs right foot )         Assessment & Plan  Pre-operative examination  Currently cleared for upcoming surgery.  Considered low risk.  Labs reviewed.  EKG reviewed as noted below    Orders:    EKG 12 Lead    Mixed hyperlipidemia       Orders:    pravastatin (PRAVACHOL) 40 MG tablet; Take 1 tablet by mouth daily    Hypothyroidism due to Hashimoto's thyroiditis       Orders:    levothyroxine (SYNTHROID) 200 MCG tablet; Take 1 tablet by mouth daily      No follow-ups on file.       Subjective   HPI  Patient presents today for preoperative clearance prior to right foot surgery.  Patient has been doing well overall.  Having slight issues with increased back pain.  Does not see pain management until February.  No new issues in the interim.  No chest pain or shortness of breath.  Review of Systems   Constitutional:  Negative for chills and fever.   HENT:  Negative for congestion, hearing loss, nosebleeds and sore throat.    Eyes:  Negative for photophobia.   Respiratory:  Negative for cough and shortness of breath.    Cardiovascular:  Negative for chest pain, palpitations and leg swelling.   Gastrointestinal:  Negative for abdominal pain, blood in stool, constipation, diarrhea, nausea and vomiting.   Endocrine: Negative for polydipsia.   Genitourinary:  Negative for dysuria, frequency, hematuria and urgency.   Musculoskeletal:  Positive for arthralgias, back pain, gait problem and myalgias.   Skin: Negative.    Neurological:  Negative for dizziness, tremors, weakness and headaches.   Hematological:  Does not bruise/bleed easily.   Psychiatric/Behavioral:  Negative for hallucinations and suicidal ideas.    All other systems reviewed and are negative.         Current Outpatient Medications:     pravastatin (PRAVACHOL) 40 MG tablet, Take 1 tablet by mouth daily, Disp:

## 2024-12-27 NOTE — PROGRESS NOTES
[facility] PRE-ADMISSION TESTING INSTRUCTIONS    The Preadmission Testing patient is instructed accordingly using the following criteria (check applicable):    ARRIVAL INSTRUCTIONS:  [x] Parking the day of Surgery is located in the Main Entrance lot.  Upon entering the door, make an immediate right to the surgery reception desk    [x] Bring photo ID and insurance card    [x] Bring in a copy of Living will or Durable Power of  papers.    [x] Please be sure to arrange for a responsible adult to provide transportation to and from the hospital    [x] Please arrange for a responsible adult to be with you for the 24 hour period post procedure due to having anesthesia    [x] If you awake am of surgery not feeling well or have temperature >100 please call [phone]    GENERAL INSTRUCTIONS:    [x] No solid foods after midnight, no gum, candy or mints.  May have clear liquids until 4 hours prior to surgery.         [x] You may brush your teeth, do not swallow any toothpaste    [x] Stop herbal supplements and vitamins 5 days prior to procedurE    [x] Shower or bath with soap, lather and rinse well, AM of Surgery, no lotion, powders or creams to surgical site    [x] No tobacco products within 24 hours of surgery     [x] No alcohol or illegal drug use, marijuana included, within 24 hours of surgery.    [x] Jewelry, body piercing's, eyeglasses, contact lenses and dentures are not permitted into surgery (bring cases)      [x] Please do not wear any nail polish, make up or hair products on the day of surgery    [x] You can expect a call the business day prior to procedure to notify you if your arrival time changes    [x] If you receive a survey after surgery we would greatly appreciate your comments    [x] Please notify surgeon if you develop any illness between now and time of surgery (cold, cough, sore throat, fever, nausea, vomiting) or any signs of infections  including skin, wounds, and  dental.

## 2024-12-31 ENCOUNTER — PREP FOR PROCEDURE (OUTPATIENT)
Dept: PODIATRY | Age: 64
End: 2024-12-31

## 2024-12-31 RX ORDER — SODIUM CHLORIDE 0.9 % (FLUSH) 0.9 %
5-40 SYRINGE (ML) INJECTION PRN
Status: CANCELLED | OUTPATIENT
Start: 2024-12-31

## 2024-12-31 RX ORDER — SODIUM CHLORIDE 9 MG/ML
INJECTION, SOLUTION INTRAVENOUS PRN
Status: CANCELLED | OUTPATIENT
Start: 2024-12-31

## 2024-12-31 RX ORDER — SODIUM CHLORIDE 0.9 % (FLUSH) 0.9 %
5-40 SYRINGE (ML) INJECTION EVERY 12 HOURS SCHEDULED
Status: CANCELLED | OUTPATIENT
Start: 2024-12-31

## 2025-01-02 ENCOUNTER — ANESTHESIA EVENT (OUTPATIENT)
Dept: OPERATING ROOM | Age: 65
End: 2025-01-02
Payer: COMMERCIAL

## 2025-01-03 ENCOUNTER — HOSPITAL ENCOUNTER (OUTPATIENT)
Dept: GENERAL RADIOLOGY | Age: 65
End: 2025-01-03
Attending: PODIATRIST
Payer: COMMERCIAL

## 2025-01-03 ENCOUNTER — HOSPITAL ENCOUNTER (OUTPATIENT)
Age: 65
Setting detail: OUTPATIENT SURGERY
Discharge: HOME | End: 2025-01-03
Attending: PODIATRIST | Admitting: PODIATRIST
Payer: COMMERCIAL

## 2025-01-03 ENCOUNTER — ANESTHESIA (OUTPATIENT)
Dept: OPERATING ROOM | Age: 65
End: 2025-01-03
Payer: COMMERCIAL

## 2025-01-03 VITALS
OXYGEN SATURATION: 98 % | SYSTOLIC BLOOD PRESSURE: 141 MMHG | RESPIRATION RATE: 17 BRPM | HEIGHT: 72 IN | BODY MASS INDEX: 23.03 KG/M2 | HEART RATE: 66 BPM | TEMPERATURE: 98.1 F | DIASTOLIC BLOOD PRESSURE: 81 MMHG | WEIGHT: 170 LBS

## 2025-01-03 DIAGNOSIS — M20.41 HAMMER TOE OF RIGHT FOOT: ICD-10-CM

## 2025-01-03 DIAGNOSIS — G89.18 POST-OP PAIN: Primary | ICD-10-CM

## 2025-01-03 DIAGNOSIS — R52 PAIN: ICD-10-CM

## 2025-01-03 DIAGNOSIS — M21.961 FOOT DEFORMITY, RIGHT: ICD-10-CM

## 2025-01-03 DIAGNOSIS — M21.961 METATARSAL DEFORMITY, RIGHT: Primary | ICD-10-CM

## 2025-01-03 PROCEDURE — 6360000002 HC RX W HCPCS

## 2025-01-03 PROCEDURE — 3700000001 HC ADD 15 MINUTES (ANESTHESIA): Performed by: PODIATRIST

## 2025-01-03 PROCEDURE — 28286 REPAIR OF HAMMERTOE: CPT | Performed by: PODIATRIST

## 2025-01-03 PROCEDURE — 88311 DECALCIFY TISSUE: CPT

## 2025-01-03 PROCEDURE — 2500000003 HC RX 250 WO HCPCS

## 2025-01-03 PROCEDURE — 3600000012 HC SURGERY LEVEL 2 ADDTL 15MIN: Performed by: PODIATRIST

## 2025-01-03 PROCEDURE — 3600000002 HC SURGERY LEVEL 2 BASE: Performed by: PODIATRIST

## 2025-01-03 PROCEDURE — 7100000010 HC PHASE II RECOVERY - FIRST 15 MIN: Performed by: PODIATRIST

## 2025-01-03 PROCEDURE — 3700000000 HC ANESTHESIA ATTENDED CARE: Performed by: PODIATRIST

## 2025-01-03 PROCEDURE — 2709999900 HC NON-CHARGEABLE SUPPLY: Performed by: PODIATRIST

## 2025-01-03 PROCEDURE — 88304 TISSUE EXAM BY PATHOLOGIST: CPT

## 2025-01-03 PROCEDURE — 7100000011 HC PHASE II RECOVERY - ADDTL 15 MIN: Performed by: PODIATRIST

## 2025-01-03 PROCEDURE — 6360000002 HC RX W HCPCS: Performed by: PODIATRIST

## 2025-01-03 PROCEDURE — 2580000003 HC RX 258: Performed by: PODIATRIST

## 2025-01-03 PROCEDURE — C1713 ANCHOR/SCREW BN/BN,TIS/BN: HCPCS | Performed by: PODIATRIST

## 2025-01-03 PROCEDURE — 2500000003 HC RX 250 WO HCPCS: Performed by: PODIATRIST

## 2025-01-03 PROCEDURE — 28113 PART REMOVAL OF METATARSAL: CPT | Performed by: PODIATRIST

## 2025-01-03 DEVICE — K WIRE FIX L6IN DIA0.045IN 1600645] MICROAIRE SURGICAL INSTRUMENTS INC]: Type: IMPLANTABLE DEVICE | Site: FIFTH TOE | Status: FUNCTIONAL

## 2025-01-03 RX ORDER — FENTANYL CITRATE 50 UG/ML
25 INJECTION, SOLUTION INTRAMUSCULAR; INTRAVENOUS EVERY 5 MIN PRN
Status: CANCELLED | OUTPATIENT
Start: 2025-01-03

## 2025-01-03 RX ORDER — KETAMINE HYDROCHLORIDE 10 MG/ML
INJECTION, SOLUTION INTRAMUSCULAR; INTRAVENOUS
Status: DISCONTINUED | OUTPATIENT
Start: 2025-01-03 | End: 2025-01-03 | Stop reason: SDUPTHER

## 2025-01-03 RX ORDER — MEPERIDINE HYDROCHLORIDE 25 MG/ML
12.5 INJECTION INTRAMUSCULAR; INTRAVENOUS; SUBCUTANEOUS ONCE
Status: CANCELLED | OUTPATIENT
Start: 2025-01-03 | End: 2025-01-03

## 2025-01-03 RX ORDER — SODIUM CHLORIDE 0.9 % (FLUSH) 0.9 %
5-40 SYRINGE (ML) INJECTION EVERY 12 HOURS SCHEDULED
Status: DISCONTINUED | OUTPATIENT
Start: 2025-01-03 | End: 2025-01-03 | Stop reason: HOSPADM

## 2025-01-03 RX ORDER — ONDANSETRON 2 MG/ML
INJECTION INTRAMUSCULAR; INTRAVENOUS
Status: DISCONTINUED | OUTPATIENT
Start: 2025-01-03 | End: 2025-01-03 | Stop reason: SDUPTHER

## 2025-01-03 RX ORDER — OXYCODONE AND ACETAMINOPHEN 5; 325 MG/1; MG/1
1 TABLET ORAL EVERY 6 HOURS PRN
Qty: 20 TABLET | Refills: 0 | Status: SHIPPED | OUTPATIENT
Start: 2025-01-03 | End: 2025-01-08

## 2025-01-03 RX ORDER — FENTANYL CITRATE 50 UG/ML
INJECTION, SOLUTION INTRAMUSCULAR; INTRAVENOUS
Status: DISCONTINUED | OUTPATIENT
Start: 2025-01-03 | End: 2025-01-03 | Stop reason: SDUPTHER

## 2025-01-03 RX ORDER — DIPHENHYDRAMINE HYDROCHLORIDE 50 MG/ML
12.5 INJECTION INTRAMUSCULAR; INTRAVENOUS
Status: CANCELLED | OUTPATIENT
Start: 2025-01-03 | End: 2025-01-04

## 2025-01-03 RX ORDER — SODIUM CHLORIDE 0.9 % (FLUSH) 0.9 %
5-40 SYRINGE (ML) INJECTION PRN
Status: CANCELLED | OUTPATIENT
Start: 2025-01-03

## 2025-01-03 RX ORDER — GLYCOPYRROLATE 0.2 MG/ML
INJECTION INTRAMUSCULAR; INTRAVENOUS
Status: DISCONTINUED | OUTPATIENT
Start: 2025-01-03 | End: 2025-01-03 | Stop reason: SDUPTHER

## 2025-01-03 RX ORDER — LABETALOL HYDROCHLORIDE 5 MG/ML
5 INJECTION, SOLUTION INTRAVENOUS
Status: CANCELLED | OUTPATIENT
Start: 2025-01-03

## 2025-01-03 RX ORDER — SODIUM CHLORIDE 0.9 % (FLUSH) 0.9 %
5-40 SYRINGE (ML) INJECTION EVERY 12 HOURS SCHEDULED
Status: CANCELLED | OUTPATIENT
Start: 2025-01-03

## 2025-01-03 RX ORDER — SODIUM CHLORIDE 9 MG/ML
INJECTION, SOLUTION INTRAVENOUS PRN
Status: DISCONTINUED | OUTPATIENT
Start: 2025-01-03 | End: 2025-01-03 | Stop reason: HOSPADM

## 2025-01-03 RX ORDER — DEXAMETHASONE SODIUM PHOSPHATE 4 MG/ML
INJECTION, SOLUTION INTRA-ARTICULAR; INTRALESIONAL; INTRAMUSCULAR; INTRAVENOUS; SOFT TISSUE
Status: DISCONTINUED | OUTPATIENT
Start: 2025-01-03 | End: 2025-01-03 | Stop reason: SDUPTHER

## 2025-01-03 RX ORDER — BUPIVACAINE HYDROCHLORIDE 5 MG/ML
INJECTION, SOLUTION EPIDURAL; INTRACAUDAL PRN
Status: DISCONTINUED | OUTPATIENT
Start: 2025-01-03 | End: 2025-01-03 | Stop reason: ALTCHOICE

## 2025-01-03 RX ORDER — MIDAZOLAM HYDROCHLORIDE 1 MG/ML
INJECTION, SOLUTION INTRAMUSCULAR; INTRAVENOUS
Status: DISCONTINUED | OUTPATIENT
Start: 2025-01-03 | End: 2025-01-03 | Stop reason: SDUPTHER

## 2025-01-03 RX ORDER — NALOXONE HYDROCHLORIDE 0.4 MG/ML
INJECTION, SOLUTION INTRAMUSCULAR; INTRAVENOUS; SUBCUTANEOUS PRN
Status: CANCELLED | OUTPATIENT
Start: 2025-01-03

## 2025-01-03 RX ORDER — HYDRALAZINE HYDROCHLORIDE 20 MG/ML
5 INJECTION INTRAMUSCULAR; INTRAVENOUS
Status: CANCELLED | OUTPATIENT
Start: 2025-01-03

## 2025-01-03 RX ORDER — PROCHLORPERAZINE EDISYLATE 5 MG/ML
5 INJECTION INTRAMUSCULAR; INTRAVENOUS
Status: CANCELLED | OUTPATIENT
Start: 2025-01-03 | End: 2025-01-04

## 2025-01-03 RX ORDER — PROPOFOL 10 MG/ML
INJECTION, EMULSION INTRAVENOUS
Status: DISCONTINUED | OUTPATIENT
Start: 2025-01-03 | End: 2025-01-03 | Stop reason: SDUPTHER

## 2025-01-03 RX ORDER — SODIUM CHLORIDE 9 MG/ML
INJECTION, SOLUTION INTRAVENOUS PRN
Status: CANCELLED | OUTPATIENT
Start: 2025-01-03

## 2025-01-03 RX ORDER — SODIUM CHLORIDE 0.9 % (FLUSH) 0.9 %
5-40 SYRINGE (ML) INJECTION PRN
Status: DISCONTINUED | OUTPATIENT
Start: 2025-01-03 | End: 2025-01-03 | Stop reason: HOSPADM

## 2025-01-03 RX ADMIN — FENTANYL CITRATE 25 MCG: 50 INJECTION, SOLUTION INTRAMUSCULAR; INTRAVENOUS at 09:12

## 2025-01-03 RX ADMIN — GLYCOPYRROLATE 0.2 MG: 0.2 INJECTION INTRAMUSCULAR; INTRAVENOUS at 09:04

## 2025-01-03 RX ADMIN — PROPOFOL 50 MG: 10 INJECTION, EMULSION INTRAVENOUS at 09:08

## 2025-01-03 RX ADMIN — DEXAMETHASONE SODIUM PHOSPHATE 8 MG: 4 INJECTION, SOLUTION INTRAMUSCULAR; INTRAVENOUS at 09:12

## 2025-01-03 RX ADMIN — KETAMINE HYDROCHLORIDE 20 MG: 10 INJECTION INTRAMUSCULAR; INTRAVENOUS at 09:08

## 2025-01-03 RX ADMIN — FENTANYL CITRATE 25 MCG: 50 INJECTION, SOLUTION INTRAMUSCULAR; INTRAVENOUS at 09:24

## 2025-01-03 RX ADMIN — FENTANYL CITRATE 25 MCG: 50 INJECTION, SOLUTION INTRAMUSCULAR; INTRAVENOUS at 09:08

## 2025-01-03 RX ADMIN — MIDAZOLAM 2 MG: 1 INJECTION INTRAMUSCULAR; INTRAVENOUS at 09:04

## 2025-01-03 RX ADMIN — KETAMINE HYDROCHLORIDE 10 MG: 10 INJECTION INTRAMUSCULAR; INTRAVENOUS at 09:26

## 2025-01-03 RX ADMIN — FENTANYL CITRATE 25 MCG: 50 INJECTION, SOLUTION INTRAMUSCULAR; INTRAVENOUS at 09:30

## 2025-01-03 RX ADMIN — PROPOFOL 100 MCG/KG/MIN: 10 INJECTION, EMULSION INTRAVENOUS at 09:09

## 2025-01-03 RX ADMIN — WATER 2000 MG: 1 INJECTION INTRAMUSCULAR; INTRAVENOUS; SUBCUTANEOUS at 09:17

## 2025-01-03 RX ADMIN — ONDANSETRON 4 MG: 2 INJECTION, SOLUTION INTRAMUSCULAR; INTRAVENOUS at 09:04

## 2025-01-03 RX ADMIN — SODIUM CHLORIDE: 9 INJECTION, SOLUTION INTRAVENOUS at 09:04

## 2025-01-03 ASSESSMENT — PAIN - FUNCTIONAL ASSESSMENT: PAIN_FUNCTIONAL_ASSESSMENT: 0-10

## 2025-01-03 ASSESSMENT — LIFESTYLE VARIABLES: SMOKING_STATUS: 0

## 2025-01-03 NOTE — OP NOTE
fashion. The lateral incision site was closed using 3-0 Vicryl and 3-0 Nylon. The foot was then dressed in a bulky dry sterile dressing consisting of adaptic, gauze, kerlix, and an ACE.      Patient tolerated the procedure and anesthesia well and was transferred to PACU with vital signs stable and vascular status intact to all aspects of the left foot.  Patient will be transferred back to his pre-op room afterward. Post-op instructions, including medications, follow-up information, and weight bearing status were discussed with patient before and after the surgery.      Electronically signed by Keiry High DPM on 1/3/2025 at 10:12 AM

## 2025-01-03 NOTE — ANESTHESIA POSTPROCEDURE EVALUATION
Department of Anesthesiology  Postprocedure Note    Patient: Oliver Walton  MRN: [id number]  YOB: 1960  Date of evaluation: 1/3/2025    Procedure Summary       Date: 01/03/25 Room / Location: [address]    Anesthesia Start: 0859 Anesthesia Stop: 1003    Procedure: ARTHROPLASTY FIFTH DIGIT RIGHT FOOT RESECTION FIFTH METATARSAL HEAD RIGHT FOOT (Right: Fifth Toe) Diagnosis:       Hammer toe of right foot      Foot deformity, right      (Hammer toe of right foot [M20.41])      (Foot deformity, right [M21.961])    Surgeons: Edilberto Tubbs DPM Responsible Provider: Compa Mendiola DO    Anesthesia Type: MAC ASA Status: 3            Anesthesia Type: No value filed.    Fabio Phase I: Fabio Score: 10    Fabio Phase II: Fabio Score: 10    Anesthesia Post Evaluation    Patient location during evaluation: bedside  Patient participation: complete - patient participated  Level of consciousness: awake  Pain score: 3  Airway patency: patent  Nausea & Vomiting: no vomiting and no nausea  Cardiovascular status: hemodynamically stable  Respiratory status: acceptable  Hydration status: stable  Comments: Patient seen and examined.  Progressing as expected.  No anesthetic related questions or concerns at this time.  Multimodal analgesia pain management approach  Pain management: adequate        No notable events documented.

## 2025-01-03 NOTE — DISCHARGE INSTRUCTIONS
tingling in the legs   New, unexplained symptoms   Cough   Call 911 or go to the emergency room right away if you have:   Shortness of breath   Chest pain   If you think you have an emergency,  CALL 911.    Medications  -Take prescription medications only as directed  -If pain is not severe, you may take the non-prescription medication that you normally take.  -If any side effects or adverse reactions occur, discontinue the medication and contact your doctor.  -Review the patient drug information leaflet, when provided, before you begin taking the medication    Ambulation and Activity  -You are advised to go directly home from the hospital  -Use the prescribed walking aids  -Surgical shoe or boot  -WT BEARING AS TOLERATED RIGHT FOOT WITH POST OP SHOE   Leg/Foot.  -Do no lift or move heavy objects  -Do not Drive    Post Anesthesia Instructions  -Do not drive or operate machinery  -Do not consume alcohol, tranquilizers, sleeping medications, or a non-prescription pain medication  -Do not make important decisions  -You should have someone home with you tonight    Care of the Operative Site  -Keep cast or bandage clean, dry, and intact  -Do not shower  -Do not remove bandage  -Elevate Operative extremity as much as possible to reduce swelling and discomfort for the first 72 hours  -Apply ice bag wrapped in thick towel over bandage 20 minutes of every hour for the first 72 hours while awake  -Do not attempt to put anything between the cast or dressing and skin, some itching is normal    Special Instructions: Call doctor immediately if you develop any of the following:  -Fever over 100 degrees by mouth - take your temperature daily  -Pain not relieved by medication ordered  -Swelling, increased redness, warmth, or hardness around operative area  -Numb, tingling or cold toes  -Toe(s) become white or bluish  -Bandage becomes wet, soiled, or blood soaked (a small amount of bleeding may be normal)  -Increasing or progressive  drainage from surgical area    Follow up - Call  Dr Tubbs office for f/u appointment. Cell phone  [phone]

## 2025-01-03 NOTE — ANESTHESIA PRE PROCEDURE
12:05 PM    GFRAA >60 09/16/2022 11:06 AM    LABGLOM >90 12/07/2024 12:05 PM    LABGLOM >60 03/20/2024 08:30 AM    GLUCOSE 108 12/07/2024 12:05 PM    CALCIUM 9.3 12/07/2024 12:05 PM    BILITOT 0.5 12/07/2024 12:05 PM    ALKPHOS 70 12/07/2024 12:05 PM    AST 29 12/07/2024 12:05 PM    ALT 45 12/07/2024 12:05 PM       POC Tests: No results for input(s): \"POCGLU\", \"POCNA\", \"POCK\", \"POCCL\", \"POCBUN\", \"POCHEMO\", \"POCHCT\" in the last 72 hours.    Coags: No results found for: \"PROTIME\", \"INR\", \"APTT\"    HCG (If Applicable): No results found for: \"PREGTESTUR\", \"PREGSERUM\", \"HCG\", \"HCGQUANT\"     ABGs: No results found for: \"PHART\", \"PO2ART\", \"LJP2MAO\", \"KLZ8RRS\", \"BEART\", \"S1GLVCYU\"     Type & Screen (If Applicable):  No results found for: \"ABORH\", \"LABANTI\"    Drug/Infectious Status (If Applicable):  No results found for: \"HIV\", \"HEPCAB\"    COVID-19 Screening (If Applicable): No results found for: \"COVID19\"        Anesthesia Evaluation  Patient summary reviewed and Nursing notes reviewed   no history of anesthetic complications:   Airway: Mallampati: III  TM distance: >3 FB   Neck ROM: full  Mouth opening: > = 3 FB   Dental:          Pulmonary:normal exam  breath sounds clear to auscultation      (-) not a current smoker                           Cardiovascular:  Exercise tolerance: good (>4 METS)  (+) hyperlipidemia      ECG reviewed  Rhythm: regular  Rate: normal           Beta Blocker:  Not on Beta Blocker      ROS comment: EKG:  Sinus Bradycardia   WITHIN NORMAL LIMITS     Neuro/Psych:                ROS comment: Cervical spondylosis without myelopathy  Chronic bilateral low back pain without sciatica  DDD (degenerative disc disease), cervical  DDD (degenerative disc disease), lumbar  Foot deformity, right  Hammer toe of right foot  Hypothyroidism due to Hashimoto's thyroiditis  Lumbar spondylosis  Lumbosacral spondylosis without myelopathy  Metatarsal deformity, right     GI/Hepatic/Renal:            ROS comment: Right

## 2025-01-03 NOTE — BRIEF OP NOTE
Brief Postoperative Note      Patient: Oliver Walton  YOB: 1960  MRN: [id number]    Date of Procedure: 1/3/2025    Pre-Op Diagnosis Codes:      * Hammer toe of right foot [M20.41]     * Foot deformity, right [M21.961]    Post-Op Diagnosis: Same       Procedure(s):  ARTHROPLASTY FIFTH DIGIT RIGHT FOOT RESECTION FIFTH METATARSAL HEAD RIGHT FOOT    Surgeon(s):  Edilberto Tubbs DPM    Assistant:  Resident: Keiry High DPM    Anesthesia: Monitor Anesthesia Care    Estimated Blood Loss (mL): less than 5     Complications: None    Specimens:   ID Type Source Tests Collected by Time Destination   A : FIFTH METATARSAL HEAD Bone Toe SURGICAL PATHOLOGY Edilberto Tubbs DPM 1/3/2025 0924        Implants:  Implant Name Type Inv. Item Serial No.  Lot No. LRB No. Used Action   K WIRE FIX L6IN DIA0.045IN [id number] [employer]] - [id number]  K WIRE FIX L6IN DIA0.045IN [id number]] [employer]]  [employer]- [id number] Right 1 Implanted         Drains: * No LDAs found *    Findings: Consistent with diagnosis     Electronically signed by Keiry High DPM on 1/3/2025 at 10:11 AM

## 2025-01-07 ENCOUNTER — OFFICE VISIT (OUTPATIENT)
Dept: PODIATRY | Age: 65
End: 2025-01-07

## 2025-01-07 VITALS
BODY MASS INDEX: 23.06 KG/M2 | WEIGHT: 170 LBS | DIASTOLIC BLOOD PRESSURE: 97 MMHG | SYSTOLIC BLOOD PRESSURE: 143 MMHG | OXYGEN SATURATION: 99 % | TEMPERATURE: 97.3 F | HEART RATE: 63 BPM

## 2025-01-07 DIAGNOSIS — Z09 POSTOPERATIVE EXAMINATION: Primary | ICD-10-CM

## 2025-01-07 LAB — SURGICAL PATHOLOGY REPORT: NORMAL

## 2025-01-07 PROCEDURE — 99024 POSTOP FOLLOW-UP VISIT: CPT | Performed by: PODIATRIST

## 2025-01-07 RX ORDER — AZITHROMYCIN 250 MG/1
TABLET, FILM COATED ORAL
Qty: 6 TABLET | Refills: 0 | Status: SHIPPED | OUTPATIENT
Start: 2025-01-07 | End: 2025-01-17

## 2025-01-07 RX ORDER — METHYLPREDNISOLONE 4 MG/1
TABLET ORAL
Qty: 21 TABLET | Refills: 0 | Status: SHIPPED | OUTPATIENT
Start: 2025-01-07 | End: 2025-01-13

## 2025-01-07 NOTE — PROGRESS NOTES
Postop Progress Note    Subjective    Oliver Walton presents to the office 4 days  following foot sx . The patient is not having any pain.      Objective    Vitals:    01/07/25 1623   BP: (!) 143/97   Pulse: 63   Temp: 97.3 °F (36.3 °C)   SpO2: 99%     General: alert, cooperative, and no distress  Incision: healing well, no drainage, no erythema, no hernia, no seroma, no swelling    Assessment    Doing well postoperatively.     Plan   1. Continue any current medications  2. Wound care discussed  3. Wound/Incision: healing well, no drainage, no erythema, no hernia, no seroma, no swelling, well approximated  4. Disposition:   Limited activities.  No heavy lifting.  No strenuous exercise.  Should not return to gym class or sports until cleared by a physician.  5. Diet: regular diet  6. Follow up: 1 week.       Patient was given a new postop shoe x-ray showing excellent pin placement reappoint in 1 week    Electronically signed by Edilberto Tubbs DPM on 1/7/2025 at 4:42 PM

## 2025-01-17 ENCOUNTER — OFFICE VISIT (OUTPATIENT)
Dept: PODIATRY | Age: 65
End: 2025-01-17

## 2025-01-17 VITALS
DIASTOLIC BLOOD PRESSURE: 82 MMHG | TEMPERATURE: 97.6 F | WEIGHT: 170 LBS | HEART RATE: 78 BPM | OXYGEN SATURATION: 98 % | BODY MASS INDEX: 23.06 KG/M2 | SYSTOLIC BLOOD PRESSURE: 134 MMHG

## 2025-01-17 DIAGNOSIS — Z09 POSTOPERATIVE EXAMINATION: Primary | ICD-10-CM

## 2025-01-17 PROCEDURE — 99024 POSTOP FOLLOW-UP VISIT: CPT | Performed by: PODIATRIST

## 2025-01-17 NOTE — PROGRESS NOTES
Postop Progress Note    Subjective    Oliver Walton presents to the office 2 weeks  following foot sx . The patient is not having any pain.      Objective    Vitals:    01/17/25 1554   BP: 134/82   Pulse: 78   Temp: 97.6 °F (36.4 °C)   SpO2: 98%     General: alert, cooperative, and no distress  Incision: healing well    Assessment    Doing well postoperatively.     Plan   1. Continue any current medications  2. Wound care discussed  3. Wound/Incision: healing well, no drainage, no erythema, no hernia, no swelling, well approximated, sutures removed  4. Disposition:   Limited activities.  No heavy lifting.  No strenuous exercise.  Should not return to gym class or sports until cleared by a physician.  5. Diet: regular diet  6. Follow up: 2 week.           Electronically signed by Edilberto Tubbs DPM on 1/17/2025 at 4:06 PM

## 2025-01-22 ENCOUNTER — OFFICE VISIT (OUTPATIENT)
Dept: PODIATRY | Age: 65
End: 2025-01-22

## 2025-01-22 VITALS
WEIGHT: 170 LBS | SYSTOLIC BLOOD PRESSURE: 138 MMHG | TEMPERATURE: 97.7 F | BODY MASS INDEX: 23.06 KG/M2 | OXYGEN SATURATION: 97 % | HEART RATE: 80 BPM | DIASTOLIC BLOOD PRESSURE: 80 MMHG

## 2025-01-22 DIAGNOSIS — Z09 POSTOPERATIVE EXAMINATION: Primary | ICD-10-CM

## 2025-01-22 PROCEDURE — 99024 POSTOP FOLLOW-UP VISIT: CPT | Performed by: PODIATRIST

## 2025-01-22 NOTE — PROGRESS NOTES
Postop Progress Note    Subjective    Oliver Walton presents to the office 3 weeks  following foot sx . The patient is not having any pain.      Objective    Vitals:    01/22/25 1657   BP: 138/80   Pulse: 80   Temp: 97.7 °F (36.5 °C)   SpO2: 97%     General: alert, cooperative, and no distress  Incision: healing well    Assessment    Doing well postoperatively.     Plan   1. Continue any current medications  2. Wound care discussed  3. Wound/Incision: healing well  4. Disposition:   Pt is to increase activities as tolerated.  Normal activities with no limitations.  May return to full duty immediately with no restrictions.  5. Diet: regular diet  6. Follow up: 3 weeks.           Electronically signed by Edilberto Tubbs DPM on 1/22/2025 at 5:15 PM

## 2025-02-07 ENCOUNTER — OFFICE VISIT (OUTPATIENT)
Dept: PODIATRY | Age: 65
End: 2025-02-07

## 2025-02-07 VITALS
TEMPERATURE: 98.2 F | OXYGEN SATURATION: 98 % | BODY MASS INDEX: 23.06 KG/M2 | WEIGHT: 170 LBS | DIASTOLIC BLOOD PRESSURE: 78 MMHG | HEART RATE: 69 BPM | SYSTOLIC BLOOD PRESSURE: 122 MMHG

## 2025-02-07 DIAGNOSIS — Z09 POSTOPERATIVE EXAMINATION: Primary | ICD-10-CM

## 2025-02-07 PROCEDURE — 99024 POSTOP FOLLOW-UP VISIT: CPT | Performed by: PODIATRIST

## 2025-02-07 NOTE — PROGRESS NOTES
Postop Progress Note    Subjective    Oliver Walton presents to the office 4 weeks  following foot surgery. Medication(s) being used: none.      Objective    Vitals:    02/07/25 1713   BP: 122/78   Pulse: 69   Temp: 98.2 °F (36.8 °C)   SpO2: 98%     General: alert, cooperative, and no distress  Incision: healing well    Assessment    Doing well postoperatively.     Plan   1. Continue any current medications  2. Wound care discussed  3. Wound/Incision: healing well  4. Disposition:   Pt is to increase activities as tolerated.  Normal activities with no limitations.  May return to full duty immediately with no restrictions.  5. Diet: regular diet  6. Follow up: 3 month.           Electronically signed by Edilberto Tubbs DPM on 2/7/2025 at 5:30 PM

## 2025-02-27 ENCOUNTER — OFFICE VISIT (OUTPATIENT)
Dept: PAIN MANAGEMENT | Age: 65
End: 2025-02-27
Payer: COMMERCIAL

## 2025-02-27 VITALS
TEMPERATURE: 98.4 F | WEIGHT: 172 LBS | RESPIRATION RATE: 18 BRPM | BODY MASS INDEX: 23.3 KG/M2 | SYSTOLIC BLOOD PRESSURE: 136 MMHG | HEIGHT: 72 IN | OXYGEN SATURATION: 96 % | HEART RATE: 64 BPM | DIASTOLIC BLOOD PRESSURE: 92 MMHG

## 2025-02-27 DIAGNOSIS — M47.817 LUMBOSACRAL SPONDYLOSIS WITHOUT MYELOPATHY: ICD-10-CM

## 2025-02-27 DIAGNOSIS — M51.369 DEGENERATION OF INTERVERTEBRAL DISC OF LUMBAR REGION, UNSPECIFIED WHETHER PAIN PRESENT: ICD-10-CM

## 2025-02-27 DIAGNOSIS — M70.61 TROCHANTERIC BURSITIS OF BOTH HIPS: Primary | ICD-10-CM

## 2025-02-27 DIAGNOSIS — M70.62 TROCHANTERIC BURSITIS OF BOTH HIPS: Primary | ICD-10-CM

## 2025-02-27 PROCEDURE — 1125F AMNT PAIN NOTED PAIN PRSNT: CPT | Performed by: ANESTHESIOLOGY

## 2025-02-27 PROCEDURE — 99213 OFFICE O/P EST LOW 20 MIN: CPT | Performed by: ANESTHESIOLOGY

## 2025-02-27 PROCEDURE — 1123F ACP DISCUSS/DSCN MKR DOCD: CPT | Performed by: ANESTHESIOLOGY

## 2025-02-27 PROCEDURE — 1159F MED LIST DOCD IN RCRD: CPT | Performed by: ANESTHESIOLOGY

## 2025-02-27 NOTE — PROGRESS NOTES
Oliver Walton presents to the [facility] on 2/27/2025. Oliver is complaining of pain in his bilateral hips as well as lumbar spine. The pain is constant. The pain is described as aching, throbbing, shooting, stabbing, sharp, tender, and penetrating. Pain is rated on his best day at a 2, on his worst day at a 8, and on average at a 6 on the VAS scale. He took his last dose of Motrin 2 days ago.     Any procedures since your last visit: No.    Pacemaker or defibrillator: No.    He is not on NSAIDS and is not on anticoagulation medications.    Do you want someone present when the provider examines you? NO.    Medication Contract and Consent for Opioid Use Documents Filed        No documents found                    BP (!) 136/92 (Site: Left Upper Arm, Position: Sitting, Cuff Size: Medium Adult)   Pulse 64   Temp 98.4 °F (36.9 °C) (Temporal)   Resp 18   Ht 1.829 m (6')   Wt 78 kg (172 lb)   SpO2 96%   BMI 23.33 kg/m²      No LMP for male patient.  
y.o.  male with H/o chronic low back pain.     Pain predominantly axial in nature.      He is an avid long distance runner- and runs regularly.     Failed conservative treatment     Xray LS spine reviewed personally and discusses.   Xray Hip reviewed: Right femoral nail is fractured in the proximal portion - stable. He ahd deferred ortho eval for this.    MRI of LS spine reviewed personally and discussed.    H/o knee pain. Xray of the knees reviewed. S/P Knee injection - has helped the knee pain significantly. Over all doing well.    S/P  lumbar MB RFA> 80% relief  Doing HEP.    Right foot pain - has undergone surgery- healed well. Follows with podiatry.    Low back pain is stable.     Current pain over the b/l lateral hip. Trochanteric bursa tenderness +. Hip ROM- no significant pain.  Xray Hip reviewed.  Clinical features of trochanteric bursitis.     PLAN:   B/l trochanteric bursa injection. RBA discussed.    PT / HEP    When low back pain recurs, consider repeat lumbar medial branch RFA.      Counseling : Patient encouraged to stay active and to continue Regular home exercise program as tolerated - stretching / strengthening.     Treatment plan discussed with the patient including medication and procedure side effects.     Controlled Substances Monitoring: [place] reviewed.     [place] MD

## 2025-03-03 ENCOUNTER — PROCEDURE VISIT (OUTPATIENT)
Dept: PAIN MANAGEMENT | Age: 65
End: 2025-03-03
Payer: COMMERCIAL

## 2025-03-03 VITALS
RESPIRATION RATE: 18 BRPM | BODY MASS INDEX: 23.3 KG/M2 | HEART RATE: 76 BPM | WEIGHT: 172 LBS | SYSTOLIC BLOOD PRESSURE: 115 MMHG | DIASTOLIC BLOOD PRESSURE: 71 MMHG | TEMPERATURE: 97.8 F | OXYGEN SATURATION: 98 % | HEIGHT: 72 IN

## 2025-03-03 DIAGNOSIS — M70.61 TROCHANTERIC BURSITIS OF BOTH HIPS: Primary | ICD-10-CM

## 2025-03-03 DIAGNOSIS — M70.62 TROCHANTERIC BURSITIS OF BOTH HIPS: Primary | ICD-10-CM

## 2025-03-03 PROCEDURE — 20610 DRAIN/INJ JOINT/BURSA W/O US: CPT | Performed by: ANESTHESIOLOGY

## 2025-03-03 RX ORDER — METHYLPREDNISOLONE ACETATE 40 MG/ML
30 INJECTION, SUSPENSION INTRA-ARTICULAR; INTRALESIONAL; INTRAMUSCULAR; SOFT TISSUE ONCE
Status: COMPLETED | OUTPATIENT
Start: 2025-03-03 | End: 2025-03-03

## 2025-03-03 RX ORDER — BUPIVACAINE HYDROCHLORIDE 2.5 MG/ML
10 INJECTION, SOLUTION EPIDURAL; INFILTRATION; INTRACAUDAL ONCE
Status: COMPLETED | OUTPATIENT
Start: 2025-03-03 | End: 2025-03-03

## 2025-03-03 RX ADMIN — BUPIVACAINE HYDROCHLORIDE 25 MG: 2.5 INJECTION, SOLUTION EPIDURAL; INFILTRATION; INTRACAUDAL at 15:05

## 2025-03-03 RX ADMIN — METHYLPREDNISOLONE ACETATE 30 MG: 40 INJECTION, SUSPENSION INTRA-ARTICULAR; INTRALESIONAL; INTRAMUSCULAR; SOFT TISSUE at 15:07

## 2025-03-03 RX ADMIN — METHYLPREDNISOLONE ACETATE 30 MG: 40 INJECTION, SUSPENSION INTRA-ARTICULAR; INTRALESIONAL; INTRAMUSCULAR; SOFT TISSUE at 15:06

## 2025-03-03 NOTE — PROGRESS NOTES
3/3/2025    Patient: Oliver Walton  :  1960  Age:  65 y.o.  Sex:  male     PRE-OPERATIVE DIAGNOSIS: Bilateral Greater trochanter bursitis.    POST-OPERATIVE DIAGNOSIS: Same.    PROCEDURE PERFORMED: Bilateral Greater trochanter bursea steroid injection.    SURGEON:   [place] MD    ANESTHESIA: local    ESTIMATED BLOOD LOSS: None.    BRIEF HISTORY: Oliver Walton comes in today for greater trochanter bursea steroid injection. After discussing the potential risks and benefits of the procedure with the patient  Oliver did request that we proceed. A complete History & Physical was reviewed and it is unchanged.    DESCRIPTION OF PROCEDURE:   Patient was placed in the lateral decubitus position.The area of the Bilateral  hip was prepped with chloraprep and draped in a sterile manner. After identifying the anatomic landmarks, a 22 gauge spinal 3 1/2 inch spinal needle was advanced until greater trochanter was contacted. After negative aspiration, a solution of 0.25 % marcaine 5 cc and 30 mg DepoMedrol was injected easily on each side after negative aspiration without complications. The needle was then removed and Band-Aid applied.    Disposition the patient tolerated the procedure well and there were no complications .     Oliver will follow up in our [facility] as scheduled. He was encouraged to call with questions, concerns or if worsening of symptoms occurs.    [place] MD

## 2025-03-03 NOTE — PROGRESS NOTES
3/3/2025    Chief Complaint   Patient presents with    Injections     Bilateral Greater trochanteric Bursitis cortisone injections.    Hip Pain     Bilateral hips       Allergies as of 03/03/2025    (No Known Allergies)        Procedure: Bilateral greater trochanteric bursa injection     yes consent signed yes procedure verified with patient  yes allergies noted yes pt confirms not pregnant    Patient rates pain a 4/10 on the VAS scale.    Skin Prep:  ChloraPrep    Anesthetic:  Ethyl Chloride     Medication:  Marcaine 0.25% and DepMedrol    Vitals:    03/03/25 1257   BP: 115/71   Site: Left Upper Arm   Position: Sitting   Cuff Size: Medium Adult   Pulse: 76   Resp: 18   Temp: 97.8 °F (36.6 °C)   TempSrc: Temporal   SpO2: 98%   Weight: 78 kg (172 lb)   Height: 1.829 m (6')       I witnessed patient signing consent to Medical Procedure and Treatment form.     Giselle Bird MA

## 2025-08-05 ENCOUNTER — PATIENT MESSAGE (OUTPATIENT)
Age: 65
End: 2025-08-05

## 2025-08-06 ENCOUNTER — OFFICE VISIT (OUTPATIENT)
Age: 65
End: 2025-08-06
Payer: MEDICARE

## 2025-08-06 VITALS
DIASTOLIC BLOOD PRESSURE: 74 MMHG | HEART RATE: 70 BPM | HEIGHT: 72 IN | BODY MASS INDEX: 23.3 KG/M2 | OXYGEN SATURATION: 98 % | TEMPERATURE: 98.2 F | SYSTOLIC BLOOD PRESSURE: 126 MMHG | WEIGHT: 172 LBS

## 2025-08-06 DIAGNOSIS — M47.817 LUMBOSACRAL SPONDYLOSIS WITHOUT MYELOPATHY: ICD-10-CM

## 2025-08-06 DIAGNOSIS — M51.369 DEGENERATION OF INTERVERTEBRAL DISC OF LUMBAR REGION, UNSPECIFIED WHETHER PAIN PRESENT: ICD-10-CM

## 2025-08-06 DIAGNOSIS — M48.061 SPINAL STENOSIS OF LUMBAR REGION, UNSPECIFIED WHETHER NEUROGENIC CLAUDICATION PRESENT: ICD-10-CM

## 2025-08-06 DIAGNOSIS — M17.10 PRIMARY OSTEOARTHRITIS OF KNEE, UNSPECIFIED LATERALITY: ICD-10-CM

## 2025-08-06 DIAGNOSIS — M54.16 LUMBAR RADICULAR PAIN: Primary | ICD-10-CM

## 2025-08-06 DIAGNOSIS — M70.60 TROCHANTERIC BURSITIS, UNSPECIFIED LATERALITY: ICD-10-CM

## 2025-08-06 PROCEDURE — 1123F ACP DISCUSS/DSCN MKR DOCD: CPT | Performed by: ANESTHESIOLOGY

## 2025-08-06 PROCEDURE — 99214 OFFICE O/P EST MOD 30 MIN: CPT | Performed by: ANESTHESIOLOGY

## 2025-08-06 PROCEDURE — 1159F MED LIST DOCD IN RCRD: CPT | Performed by: ANESTHESIOLOGY

## 2025-08-06 RX ORDER — PREDNISONE 10 MG/1
TABLET ORAL
Qty: 21 TABLET | Refills: 0 | Status: SHIPPED | OUTPATIENT
Start: 2025-08-06 | End: 2025-08-12

## 2025-08-06 RX ORDER — BACLOFEN 5 MG/1
5 TABLET ORAL 2 TIMES DAILY PRN
Qty: 30 TABLET | Refills: 1 | Status: SHIPPED | OUTPATIENT
Start: 2025-08-06

## 2025-08-25 ENCOUNTER — OFFICE VISIT (OUTPATIENT)
Age: 65
End: 2025-08-25
Payer: MEDICARE

## 2025-08-25 VITALS
HEART RATE: 72 BPM | HEIGHT: 72 IN | BODY MASS INDEX: 23.3 KG/M2 | TEMPERATURE: 98 F | OXYGEN SATURATION: 98 % | SYSTOLIC BLOOD PRESSURE: 114 MMHG | DIASTOLIC BLOOD PRESSURE: 72 MMHG | WEIGHT: 172 LBS

## 2025-08-25 DIAGNOSIS — M51.369 DEGENERATION OF INTERVERTEBRAL DISC OF LUMBAR REGION, UNSPECIFIED WHETHER PAIN PRESENT: ICD-10-CM

## 2025-08-25 DIAGNOSIS — M48.061 SPINAL STENOSIS OF LUMBAR REGION, UNSPECIFIED WHETHER NEUROGENIC CLAUDICATION PRESENT: ICD-10-CM

## 2025-08-25 DIAGNOSIS — M54.16 LUMBAR RADICULAR PAIN: ICD-10-CM

## 2025-08-25 DIAGNOSIS — M47.817 LUMBOSACRAL SPONDYLOSIS WITHOUT MYELOPATHY: Primary | ICD-10-CM

## 2025-08-25 PROCEDURE — 1123F ACP DISCUSS/DSCN MKR DOCD: CPT | Performed by: ANESTHESIOLOGY

## 2025-08-25 PROCEDURE — 99213 OFFICE O/P EST LOW 20 MIN: CPT | Performed by: ANESTHESIOLOGY

## 2025-08-27 ENCOUNTER — TELEPHONE (OUTPATIENT)
Age: 65
End: 2025-08-27

## 2025-08-27 DIAGNOSIS — M54.16 LUMBAR RADICULAR PAIN: Primary | ICD-10-CM

## 2025-08-29 RX ORDER — M-VIT,TX,IRON,MINS/CALC/FOLIC 27MG-0.4MG
1 TABLET ORAL DAILY
COMMUNITY

## 2025-09-04 ENCOUNTER — HOSPITAL ENCOUNTER (OUTPATIENT)
Age: 65
Setting detail: OUTPATIENT SURGERY
Discharge: HOME | End: 2025-09-04
Attending: ANESTHESIOLOGY | Admitting: ANESTHESIOLOGY
Payer: MEDICARE

## 2025-09-04 ENCOUNTER — HOSPITAL ENCOUNTER (OUTPATIENT)
Dept: GENERAL RADIOLOGY | Age: 65
Setting detail: OUTPATIENT SURGERY
Discharge: HOME | End: 2025-09-06
Attending: ANESTHESIOLOGY
Payer: MEDICARE

## 2025-09-04 VITALS
OXYGEN SATURATION: 97 % | HEART RATE: 56 BPM | HEIGHT: 72 IN | TEMPERATURE: 98 F | BODY MASS INDEX: 22.08 KG/M2 | RESPIRATION RATE: 16 BRPM | SYSTOLIC BLOOD PRESSURE: 147 MMHG | WEIGHT: 163 LBS | DIASTOLIC BLOOD PRESSURE: 77 MMHG

## 2025-09-04 DIAGNOSIS — R52 PAIN MANAGEMENT: ICD-10-CM

## 2025-09-04 PROCEDURE — 3600000002 HC SURGERY LEVEL 2 BASE: Performed by: ANESTHESIOLOGY

## 2025-09-04 PROCEDURE — 7100000010 HC PHASE II RECOVERY - FIRST 15 MIN: Performed by: ANESTHESIOLOGY

## 2025-09-04 PROCEDURE — 6360000002 HC RX W HCPCS: Performed by: ANESTHESIOLOGY

## 2025-09-04 PROCEDURE — 6360000004 HC RX CONTRAST MEDICATION: Performed by: ANESTHESIOLOGY

## 2025-09-04 PROCEDURE — 7100000011 HC PHASE II RECOVERY - ADDTL 15 MIN: Performed by: ANESTHESIOLOGY

## 2025-09-04 PROCEDURE — 62323 NJX INTERLAMINAR LMBR/SAC: CPT | Performed by: ANESTHESIOLOGY

## 2025-09-04 PROCEDURE — 2709999900 HC NON-CHARGEABLE SUPPLY: Performed by: ANESTHESIOLOGY

## 2025-09-04 RX ORDER — SODIUM CHLORIDE 0.9 % (FLUSH) 0.9 %
5-40 SYRINGE (ML) INJECTION PRN
Status: DISCONTINUED | OUTPATIENT
Start: 2025-09-04 | End: 2025-09-04 | Stop reason: HOSPADM

## 2025-09-04 RX ORDER — LIDOCAINE HYDROCHLORIDE 5 MG/ML
INJECTION, SOLUTION INFILTRATION; INTRAVENOUS PRN
Status: DISCONTINUED | OUTPATIENT
Start: 2025-09-04 | End: 2025-09-04 | Stop reason: ALTCHOICE

## 2025-09-04 RX ORDER — SODIUM CHLORIDE 0.9 % (FLUSH) 0.9 %
5-40 SYRINGE (ML) INJECTION EVERY 12 HOURS SCHEDULED
Status: DISCONTINUED | OUTPATIENT
Start: 2025-09-04 | End: 2025-09-04 | Stop reason: HOSPADM

## 2025-09-04 RX ORDER — SODIUM CHLORIDE 9 MG/ML
INJECTION, SOLUTION INTRAVENOUS PRN
Status: DISCONTINUED | OUTPATIENT
Start: 2025-09-04 | End: 2025-09-04 | Stop reason: HOSPADM

## 2025-09-04 RX ORDER — IOPAMIDOL 612 MG/ML
INJECTION, SOLUTION INTRATHECAL PRN
Status: DISCONTINUED | OUTPATIENT
Start: 2025-09-04 | End: 2025-09-04 | Stop reason: ALTCHOICE

## 2025-09-04 RX ORDER — METHYLPREDNISOLONE ACETATE 40 MG/ML
INJECTION, SUSPENSION INTRA-ARTICULAR; INTRALESIONAL; INTRAMUSCULAR; SOFT TISSUE PRN
Status: DISCONTINUED | OUTPATIENT
Start: 2025-09-04 | End: 2025-09-04 | Stop reason: ALTCHOICE

## 2025-09-04 ASSESSMENT — PAIN - FUNCTIONAL ASSESSMENT: PAIN_FUNCTIONAL_ASSESSMENT: 0-10

## 2025-09-04 ASSESSMENT — PAIN SCALES - GENERAL
PAINLEVEL_OUTOF10: 0
PAINLEVEL_OUTOF10: 0

## (undated) DEVICE — GAUZE,SPONGE,4"X4",8PLY,STRL,LF,10/TRAY: Brand: MEDLINE

## (undated) DEVICE — 3 ML SYRINGE LUER-LOCK TIP: Brand: MONOJECT

## (undated) DEVICE — 3M™ RED DOT™ MONITORING ELECTRODE WITH FOAM TAPE AND STICKY GEL 2560, 50/BAG, 20/CASE, 72/PLT: Brand: RED DOT™

## (undated) DEVICE — PADDING UNDERCAST W4INXL4YD COT FBR LO LINTING WYTEX

## (undated) DEVICE — NEEDLE HYPO 25GA L1.5IN BLU POLYPR HUB S STL REG BVL STR

## (undated) DEVICE — TROCAR: Brand: KII FIOS FIRST ENTRY

## (undated) DEVICE — GLOVE ORANGE PI 7 1/2   MSG9075

## (undated) DEVICE — BANDAGE ADH W1XL3IN NAT FAB WVN FLX DURABLE N ADH PD SEAL

## (undated) DEVICE — ELECTRODE EKG AD W1.6XL1.36IN FOAM TAPE DIAPHORETIC FLD

## (undated) DEVICE — BASIC DOUBLE BASIN 2-LF: Brand: MEDLINE INDUSTRIES, INC.

## (undated) DEVICE — NEEDLE SPNL 22GA L3.5IN BLK HUB S STL REG WALL FIT STYL

## (undated) DEVICE — TROCAR ENDOSCP SHFT L150MM DIA8MM TEAL BLDELSS W/ STBL

## (undated) DEVICE — GOWN,SIRUS,NONRNF,SETINSLV,XL,20/CS: Brand: MEDLINE

## (undated) DEVICE — NEEDLE HYPO 18GA L1.5IN PNK POLYPR HUB S STL REG BVL STR

## (undated) DEVICE — LIQUIBAND RAPID ADHESIVE 36/CS 0.8ML: Brand: MEDLINE

## (undated) DEVICE — Device

## (undated) DEVICE — ELECTRODE PT RET AD L9FT HI MOIST COND ADH HYDRGEL CORDED

## (undated) DEVICE — GLOVE ORANGE PI 8   MSG9080

## (undated) DEVICE — SPONGE GZ W4XL4IN 8 PLY 100% COTTON

## (undated) DEVICE — TOWEL,OR,DSP,ST,BLUE,STD,6/PK,12PK/CS: Brand: MEDLINE

## (undated) DEVICE — 4-PORT MANIFOLD: Brand: NEPTUNE 2

## (undated) DEVICE — 12 ML SYRINGE,LUER-LOCK TIP: Brand: MONOJECT

## (undated) DEVICE — STANDARD HYPODERMIC NEEDLE,ALUMINUM HUB: Brand: MONOJECT

## (undated) DEVICE — SYRINGE MED 5ML STD CLR PLAS LUERLOCK TIP N CTRL DISP

## (undated) DEVICE — TUBING SUCT 12FR MAL ALUM SHFT FN CAP VENT UNIV CONN W/ OBT

## (undated) DEVICE — SYRINGE MED 12ML STD CLR PLAS LUERLOCK TIP ULT SHRP

## (undated) DEVICE — 22GX5" WHITACRE SPINAL NEEDLE: Brand: MEDLINE

## (undated) DEVICE — BANDAGE,GAUZE,CONFORMING,3"X75",STRL,LF: Brand: MEDLINE

## (undated) DEVICE — SYRINGE FLSH 6ML BOLD GRAD 0.2ML LUERLOCK TIP ULT SHRP TRI

## (undated) DEVICE — NON-DEHP CATHETER EXTENSION SET, MALE LUER LOCK ADAPTER

## (undated) DEVICE — Device: Brand: PORTEX

## (undated) DEVICE — DRESSING,GAUZE,XEROFORM,CURAD,1"X8",ST: Brand: CURAD

## (undated) DEVICE — BLADE,STAINLESS-STEEL,10,STRL,DISPOSABLE: Brand: MEDLINE

## (undated) DEVICE — [HIGH FLOW INSUFFLATOR,  DO NOT USE IF PACKAGE IS DAMAGED,  KEEP DRY,  KEEP AWAY FROM SUNLIGHT,  PROTECT FROM HEAT AND RADIOACTIVE SOURCES.]: Brand: PNEUMOSURE

## (undated) DEVICE — GOWN,SIRUS,FABRNF,XL,20/CS: Brand: MEDLINE

## (undated) DEVICE — BLADE,STAINLESS-STEEL,15,STRL,DISPOSABLE: Brand: MEDLINE

## (undated) DEVICE — 6 ML SYRINGE LUER-LOCK TIP: Brand: MONOJECT

## (undated) DEVICE — BNDG,ELSTC,MATRIX,STRL,4"X5YD,LF,HOOK&LP: Brand: MEDLINE

## (undated) DEVICE — LAPAROSCOPIC SCISSORS: Brand: EPIX LAPAROSCOPIC SCISSORS

## (undated) DEVICE — COVER,LIGHT HANDLE,FLX,1/PK: Brand: MEDLINE INDUSTRIES, INC.

## (undated) DEVICE — SYRINGE MED 10ML TRNSLUC BRL PLUNG BLK MRK POLYPR CTRL

## (undated) DEVICE — GAUZE,SPONGE,4"X4",12PLY,STERILE,LF,2'S: Brand: MEDLINE

## (undated) DEVICE — APPLICATOR MEDICATED 10.5 CC SOLUTION HI LT ORNG CHLORAPREP

## (undated) DEVICE — APPLICATOR MEDICATED 26 CC SOLUTION HI LT ORNG CHLORAPREP

## (undated) DEVICE — DOUBLE BASIN SET: Brand: MEDLINE INDUSTRIES, INC.

## (undated) DEVICE — DRAPE,EXTREMITY,89X128,STERILE: Brand: MEDLINE

## (undated) DEVICE — PACK SURG LAP CHOLE CUSTOM

## (undated) DEVICE — INSUFFLATION NEEDLE TO ESTABLISH PNEUMOPERITONEUM.: Brand: INSUFFLATION NEEDLE

## (undated) DEVICE — WIPES SKIN CLOTH READYPREP 9 X 10.5 IN 2% CHLORHEX GLUCONATE CHG PREOP

## (undated) DEVICE — GARMENT,MEDLINE,DVT,INT,CALF,MED, GEN2: Brand: MEDLINE

## (undated) DEVICE — SYRINGE EPI 7ML POLYPR PLAS LUERSLIP LOSS OF RESISTANCE LO

## (undated) DEVICE — BNDG,ELSTC,MATRIX,STRL,3"X5YD,LF,HOOK&LP: Brand: MEDLINE

## (undated) DEVICE — SYRINGE, LUER LOCK, 5ML: Brand: MEDLINE

## (undated) DEVICE — SET EXTN PRIMING 0.26ML L6.8IN MICBOR STR TYP CATH M

## (undated) DEVICE — TROCAR: Brand: KII SLEEVE

## (undated) DEVICE — MARKER,SKIN,WI/RULER AND LABELS: Brand: MEDLINE

## (undated) DEVICE — COUNTER NDL 10 COUNT HLD 20 FOAM BLK SGL MAG

## (undated) DEVICE — SINGLE USE DEVICE INTENDED TO COVER EXPOSED ENDS OF ORTHOPEDIC PIN AND K-WIRES TO HELP PROTECT THE EXPOSED WIRE FROM SNAGGING ON CLOTHING.: Brand: OXBORO™ PIN COVER